# Patient Record
Sex: MALE | Race: WHITE | NOT HISPANIC OR LATINO | Employment: OTHER | URBAN - METROPOLITAN AREA
[De-identification: names, ages, dates, MRNs, and addresses within clinical notes are randomized per-mention and may not be internally consistent; named-entity substitution may affect disease eponyms.]

---

## 2017-03-10 PROBLEM — R31.29 MICROHEMATURIA: Status: ACTIVE | Noted: 2017-03-10

## 2017-03-10 PROBLEM — N20.1 LEFT URETERAL STONE: Status: ACTIVE | Noted: 2017-03-10

## 2017-03-15 PROBLEM — R31.29 MICROHEMATURIA: Status: RESOLVED | Noted: 2017-03-10 | Resolved: 2017-03-15

## 2018-08-20 ENCOUNTER — OFFICE VISIT (OUTPATIENT)
Dept: UROLOGY | Facility: CLINIC | Age: 68
End: 2018-08-20
Payer: MEDICARE

## 2018-08-20 VITALS
SYSTOLIC BLOOD PRESSURE: 136 MMHG | BODY MASS INDEX: 28.4 KG/M2 | HEART RATE: 71 BPM | HEIGHT: 68 IN | DIASTOLIC BLOOD PRESSURE: 90 MMHG | WEIGHT: 187.38 LBS

## 2018-08-20 DIAGNOSIS — N13.8 BPH WITH URINARY OBSTRUCTION: ICD-10-CM

## 2018-08-20 DIAGNOSIS — E29.1 MALE HYPOGONADISM: ICD-10-CM

## 2018-08-20 DIAGNOSIS — N52.01 ERECTILE DYSFUNCTION DUE TO ARTERIAL INSUFFICIENCY: Primary | ICD-10-CM

## 2018-08-20 DIAGNOSIS — I10 ESSENTIAL HYPERTENSION: ICD-10-CM

## 2018-08-20 DIAGNOSIS — N40.1 BPH WITH URINARY OBSTRUCTION: ICD-10-CM

## 2018-08-20 PROBLEM — N20.1 LEFT URETERAL STONE: Status: RESOLVED | Noted: 2017-03-10 | Resolved: 2018-08-20

## 2018-08-20 PROCEDURE — 99213 OFFICE O/P EST LOW 20 MIN: CPT | Mod: PBBFAC | Performed by: UROLOGY

## 2018-08-20 PROCEDURE — 99999 PR PBB SHADOW E&M-EST. PATIENT-LVL III: CPT | Mod: PBBFAC,,, | Performed by: UROLOGY

## 2018-08-20 PROCEDURE — 99204 OFFICE O/P NEW MOD 45 MIN: CPT | Mod: S$PBB,,, | Performed by: UROLOGY

## 2018-08-20 RX ORDER — TADALAFIL 20 MG/1
20 TABLET ORAL DAILY PRN
Qty: 10 TABLET | Refills: 11 | Status: SHIPPED | OUTPATIENT
Start: 2018-08-20 | End: 2019-10-24 | Stop reason: SDUPTHER

## 2018-08-20 RX ORDER — PREGABALIN 75 MG/1
150 CAPSULE ORAL 2 TIMES DAILY
COMMUNITY
End: 2018-11-08 | Stop reason: CLARIF

## 2018-08-20 NOTE — PROGRESS NOTES
CHIEF COMPLAINT:    Mr. Braxton is a 68 y.o. male presenting with ED and LUTS.    PRESENTING ILLNESS:    Naresh Braxton is a 68 y.o. male who's son is friends with Chente Cote who c/o LUTS.  He has nocturia x 4, + decreased FOS, + frequency.  He is on flomax.  He drinks a pot of coffee in the am and 2 glasses of wine at night.  No hematuria.  No dysuria.  He's not pleased with how he voids.    He has ED.  This has been present for > 1 year.  He gets good results with Cialis.    He has a history of hypogonadism.  Managed by his PCP with IM TRT.  He doesn't like the shots.  He last used it 1 month ago.      REVIEW OF SYSTEMS:    Naresh Braxton denies headache, blurred vision, fever, nausea, vomiting, chills, abdominal pain, bleeding per rectum, cough, SOB, recent loss of consciousness, recent mental status changes, seizures, dizziness, or upper or lower extremity weakness.    YIMI  1. 1  2. 2  3. 4  4. 4  5. 4      PATIENT HISTORY:    Past Medical History:   Diagnosis Date    Arthritis     GERD (gastroesophageal reflux disease)     Hernia     Hypertension     Renal stone     Wears glasses        Past Surgical History:   Procedure Laterality Date    APPENDECTOMY      COLONOSCOPY      FRACTURE SURGERY Right     foot    PROSTATE BIOPSY      TONSILLECTOMY         Family History   Problem Relation Age of Onset    Cancer Mother     Heart disease Father     Hypertension Brother        Social History     Socioeconomic History    Marital status:      Spouse name: Not on file    Number of children: Not on file    Years of education: Not on file    Highest education level: Not on file   Social Needs    Financial resource strain: Not on file    Food insecurity - worry: Not on file    Food insecurity - inability: Not on file    Transportation needs - medical: Not on file    Transportation needs - non-medical: Not on file   Occupational History    Not on file   Tobacco Use    Smoking status: Never  Smoker   Substance and Sexual Activity    Alcohol use: Yes    Drug use: Not on file    Sexual activity: Not on file   Other Topics Concern    Not on file   Social History Narrative    Not on file       Allergies:  Patient has no known allergies.    Medications:    Current Outpatient Medications:     aspirin 81 mg Tab, Take by mouth.  , Disp: , Rfl:     ESOMEPRAZOLE MAGNESIUM (NEXIUM ORAL), Take 40 mg by mouth once daily. , Disp: , Rfl:     fluticasone (FLONASE) 50 mcg/actuation nasal spray, 1 spray by Nasal route daily as needed. , Disp: , Rfl:     losartan-hydrochlorothiazide 50-12.5 mg (HYZAAR) 50-12.5 mg per tablet, Take 1 tablet by mouth once daily., Disp: , Rfl:     multivitamin (THERAGRAN) per tablet, Take 1 tablet by mouth once daily., Disp: , Rfl:     pregabalin (LYRICA) 75 MG capsule, Take 150 mg by mouth 2 (two) times daily., Disp: , Rfl:     TAMSULOSIN HCL (FLOMAX ORAL), Take 0.4 mg by mouth once daily., Disp: , Rfl:     testosterone cypionate (DEPOTESTOTERONE CYPIONATE) 100 mg/mL injection, Inject into the muscle every 7 days., Disp: , Rfl:     ZOLPIDEM TARTRATE (AMBIEN ORAL), Take 1 tablet by mouth nightly as needed. , Disp: , Rfl:     tadalafil (CIALIS) 20 MG Tab, Take 1 tablet (20 mg total) by mouth daily as needed. Take 1 hour before intercourse, Disp: 10 tablet, Rfl: 11    PHYSICAL EXAMINATION:    The patient generally appears in good health, is appropriately interactive, and is in no apparent distress.     Eyes: anicteric sclerae, moist conjunctivae; no lid-lag; PERRLA     HENT: Atraumatic; oropharynx clear with moist mucous membranes and no mucosal ulcerations;normal hard and soft palate.  No evidence of lymphadenopathy.    Neck: Trachea midline.  No thyromegaly.    Musculoskeletal: No abnormal gait.    Skin: No lesions.    Mental: Cooperative with normal affect.  Is oriented to time, place, and person.    Neuro: Grossly intact.    Chest: Normal inspiratory effort.   No accessory  muscles.  No audible wheezes.  Respirations symmetric on inspiration and expiration.    Heart: Regular rhythm.      Abdomen:  Soft, non-tender. No masses or organomegaly. Bladder is not palpable. No evidence of flank discomfort. No evidence of inguinal hernia.    Genitourinary: The penis is circumcised with no evidence of plaques or induration. The urethral meatus is normal. The testes, epididymides, and cord structures are normal in size and contour bilaterally. The scrotum is normal in size and contour.    Normal anal sphincter tone. No rectal mass.    The prostate is 40 g. Normal landmarks. Lateral sulci. Median furrow intact.  No nodularity or induration. Seminal vesicles are normal.    Extremities: No clubbing, cyanosis, or edema      LABS:    PVR done immediately after voiding by my nurse is 12 cc.   No results found for: PSA, PSADIAG, PSATOTAL, PSAFREE, PSAFREEPCT    IMPRESSION:    Encounter Diagnoses   Name Primary?    Erectile dysfunction due to arterial insufficiency Yes    BPH with urinary obstruction     Male hypogonadism     Essential hypertension    HTN, controlled      PLAN:    1. Will decrease his caffeine intake and alcohol intake for his LUTS.  2. Will cysto to see if he is a candidate for rezum.  3. Continue Cialis for the ED. Side effects discussed.  A new Rx was given.  Sent to "[facility].  4. Will check a T for the hypogonadism.    Copy to:

## 2018-08-20 NOTE — LETTER
August 20, 2018      Riley Ferrer, DO  [address] [place] [address]  [place] [address]           [facility] - Urology 4th Floor  [address]  [place] [address]  Phone: [phone]          Patient: Naresh Braxton   MR Number: [id number]   YOB: 1950   Date of Visit: 8/20/2018       Dear Dr. Riley Ferrer:    Thank you for referring Naresh Braxton to me for evaluation. Attached you will find relevant portions of my assessment and plan of care.    If you have questions, please do not hesitate to call me. I look forward to following Naresh Braxton along with you.    Sincerely,    Tree Cote MD    Enclosure  CC:  No Recipients    If you would like to receive this communication electronically, please contact [email or web address] or [phone] to request more information on [facility] access.    For providers and/or their staff who would like to refer a patient to [facility], please contact us through our one-stop-shop provider referral line, [facility] , at [phone].    If you feel you have received this communication in error or would no longer like to receive these types of communications, please e-mail [email or web address]

## 2018-08-21 ENCOUNTER — PATIENT MESSAGE (OUTPATIENT)
Dept: UROLOGY | Facility: CLINIC | Age: 68
End: 2018-08-21

## 2018-08-21 ENCOUNTER — TELEPHONE (OUTPATIENT)
Dept: UROLOGY | Facility: CLINIC | Age: 68
End: 2018-08-21

## 2018-08-21 DIAGNOSIS — E29.1 MALE HYPOGONADISM: Primary | ICD-10-CM

## 2018-08-21 NOTE — TELEPHONE ENCOUNTER
Pt notified of results. Pt will call back to schedule lab appt at [facility] facility closer to him. Pt aware and verbalized understanding.

## 2018-08-24 ENCOUNTER — LAB VISIT (OUTPATIENT)
Dept: LAB | Facility: HOSPITAL | Age: 68
End: 2018-08-24
Attending: UROLOGY
Payer: MEDICARE

## 2018-08-24 DIAGNOSIS — E29.1 MALE HYPOGONADISM: ICD-10-CM

## 2018-08-24 LAB
PROLACTIN SERPL IA-MCNC: 7.1 NG/ML
TESTOST SERPL-MCNC: 163 NG/DL

## 2018-08-24 PROCEDURE — 84146 ASSAY OF PROLACTIN: CPT

## 2018-08-24 PROCEDURE — 36415 COLL VENOUS BLD VENIPUNCTURE: CPT | Mod: PO

## 2018-08-24 PROCEDURE — 84403 ASSAY OF TOTAL TESTOSTERONE: CPT

## 2018-08-25 ENCOUNTER — PATIENT MESSAGE (OUTPATIENT)
Dept: UROLOGY | Facility: CLINIC | Age: 68
End: 2018-08-25

## 2018-08-27 ENCOUNTER — TELEPHONE (OUTPATIENT)
Dept: UROLOGY | Facility: CLINIC | Age: 68
End: 2018-08-27

## 2018-09-06 ENCOUNTER — OFFICE VISIT (OUTPATIENT)
Dept: UROLOGY | Facility: CLINIC | Age: 68
End: 2018-09-06
Payer: MEDICARE

## 2018-09-06 VITALS
WEIGHT: 189.63 LBS | BODY MASS INDEX: 28.74 KG/M2 | DIASTOLIC BLOOD PRESSURE: 74 MMHG | HEIGHT: 68 IN | HEART RATE: 59 BPM | SYSTOLIC BLOOD PRESSURE: 158 MMHG

## 2018-09-06 DIAGNOSIS — Z79.899 ENCOUNTER FOR LONG-TERM (CURRENT) USE OF HIGH-RISK MEDICATION: ICD-10-CM

## 2018-09-06 DIAGNOSIS — N13.8 BPH WITH URINARY OBSTRUCTION: ICD-10-CM

## 2018-09-06 DIAGNOSIS — N52.01 ERECTILE DYSFUNCTION DUE TO ARTERIAL INSUFFICIENCY: ICD-10-CM

## 2018-09-06 DIAGNOSIS — E29.1 MALE HYPOGONADISM: Primary | ICD-10-CM

## 2018-09-06 DIAGNOSIS — I10 ESSENTIAL HYPERTENSION: ICD-10-CM

## 2018-09-06 DIAGNOSIS — N40.1 BPH WITH URINARY OBSTRUCTION: ICD-10-CM

## 2018-09-06 PROCEDURE — 99213 OFFICE O/P EST LOW 20 MIN: CPT | Mod: PBBFAC | Performed by: UROLOGY

## 2018-09-06 PROCEDURE — 99214 OFFICE O/P EST MOD 30 MIN: CPT | Mod: S$PBB,,, | Performed by: UROLOGY

## 2018-09-06 PROCEDURE — 99999 PR PBB SHADOW E&M-EST. PATIENT-LVL III: CPT | Mod: PBBFAC,,, | Performed by: UROLOGY

## 2018-09-06 RX ORDER — TESTOSTERONE 20.25 MG/1.25G
GEL TOPICAL
Qty: 1 BOTTLE | Refills: 5 | Status: SHIPPED | OUTPATIENT
Start: 2018-09-06 | End: 2022-04-27 | Stop reason: ALTCHOICE

## 2018-09-06 NOTE — PROGRESS NOTES
CHIEF COMPLAINT:    Mr. Braxton is a 68 y.o. male presenting with ED and LUTS.    PRESENTING ILLNESS:    Naresh Braxton is a 68 y.o. male who's son is friends with Chente Cote who c/o LUTS.  He has nocturia x 4, + decreased FOS, + frequency.  He is on flomax.  He's decreased his caffeine intake and still has the LUTS.  Has 2 glasses of wine at night.  No hematuria.  No dysuria.  He's not pleased with how he voids.  Is scheduled for cysto to see if he is a candidate for Rezum.    He has ED.  This has been present for > 1 year.  He gets good results with Cialis.    He has a history of hypogonadism.  Managed by his PCP with [facility].  He didn't like the shots.  I checked a T on him which was low.  He's here to discuss.    REVIEW OF SYSTEMS:    Naresh Braxton denies headache, blurred vision, fever, nausea, vomiting, chills, abdominal pain, bleeding per rectum, cough, SOB, recent loss of consciousness, recent mental status changes, seizures, dizziness, or upper or lower extremity weakness.    YIMI  1. 2  2. 3  3. 3  4. 4  5. 4     PATIENT HISTORY:    Past Medical History:   Diagnosis Date    Arthritis     GERD (gastroesophageal reflux disease)     Hernia     Hypertension     Renal stone     Wears glasses        Past Surgical History:   Procedure Laterality Date    APPENDECTOMY      COLONOSCOPY      FRACTURE SURGERY Right     foot    PROSTATE BIOPSY      TONSILLECTOMY         Family History   Problem Relation Age of Onset    Cancer Mother     Heart disease Father     Hypertension Brother        Social History     Socioeconomic History    Marital status:      Spouse name: Not on file    Number of children: Not on file    Years of education: Not on file    Highest education level: Not on file   Social Needs    Financial resource strain: Not on file    Food insecurity - worry: Not on file    Food insecurity - inability: Not on file    Transportation needs - medical: Not on file    Transportation  needs - non-medical: Not on file   Occupational History    Not on file   Tobacco Use    Smoking status: Never Smoker   Substance and Sexual Activity    Alcohol use: Yes    Drug use: Not on file    Sexual activity: Not on file   Other Topics Concern    Not on file   Social History Narrative    Not on file       Allergies:  Patient has no known allergies.    Medications:    Current Outpatient Medications:     aspirin 81 mg Tab, Take by mouth.  , Disp: , Rfl:     ESOMEPRAZOLE MAGNESIUM (NEXIUM ORAL), Take 40 mg by mouth once daily. , Disp: , Rfl:     fluticasone (FLONASE) 50 mcg/actuation nasal spray, 1 spray by Nasal route daily as needed. , Disp: , Rfl:     losartan-hydrochlorothiazide 50-12.5 mg (HYZAAR) 50-12.5 mg per tablet, Take 1 tablet by mouth once daily., Disp: , Rfl:     multivitamin (THERAGRAN) per tablet, Take 1 tablet by mouth once daily., Disp: , Rfl:     pregabalin (LYRICA) 75 MG capsule, Take 150 mg by mouth 2 (two) times daily., Disp: , Rfl:     tadalafil (CIALIS) 20 MG Tab, Take 1 tablet (20 mg total) by mouth daily as needed. Take 1 hour before intercourse, Disp: 10 tablet, Rfl: 11    TAMSULOSIN HCL (FLOMAX ORAL), Take 0.4 mg by mouth once daily., Disp: , Rfl:     testosterone cypionate (DEPOTESTOTERONE CYPIONATE) 100 mg/mL injection, Inject into the muscle every 7 days., Disp: , Rfl:     ZOLPIDEM TARTRATE (AMBIEN ORAL), Take 1 tablet by mouth nightly as needed. , Disp: , Rfl:     PHYSICAL EXAMINATION:    The patient generally appears in good health, is appropriately interactive, and is in no apparent distress.     Eyes: anicteric sclerae, moist conjunctivae; no lid-lag; PERRLA     HENT: Atraumatic; oropharynx clear with moist mucous membranes and no mucosal ulcerations;normal hard and soft palate.  No evidence of lymphadenopathy.    Neck: Trachea midline.  No thyromegaly.    Musculoskeletal: No abnormal gait.    Skin: No lesions.    Mental: Cooperative with normal affect.  Is  oriented to time, place, and person.    Neuro: Grossly intact.    Chest: Normal inspiratory effort.   No accessory muscles.  No audible wheezes.  Respirations symmetric on inspiration and expiration.    Heart: Regular rhythm.      Abdomen:  Soft, non-tender. No masses or organomegaly. Bladder is not palpable. No evidence of flank discomfort. No evidence of inguinal hernia.    Genitourinary: The penis is circumcised with no evidence of plaques or induration. The urethral meatus is normal. The testes, epididymides, and cord structures are normal in size and contour bilaterally. The scrotum is normal in size and contour.    Normal anal sphincter tone. No rectal mass.    The prostate is 40 g. Normal landmarks. Lateral sulci. Median furrow intact.  No nodularity or induration. Seminal vesicles are normal.    Extremities: No clubbing, cyanosis, or edema      LABS:    PVR done immediately after voiding by my nurse is 12 cc.   No results found for: PSA, PSADIAG, PSATOTAL, PSAFREE, PSAFREEPCT    IMPRESSION:    Encounter Diagnoses   Name Primary?    Male hypogonadism Yes    BPH with urinary obstruction     Erectile dysfunction due to arterial insufficiency     Essential hypertension    HTN, controlled      PLAN:    1. Will cysto to see if he is a candidate for rezum.  2. Continue Cialis for the ED.   3. Discussed the risks and benefits of testosterone replacement today.  This included possible cardiac risks.  He would like to try this.  Will check a T in 2 weeks and adjust the dose of TRT if necessary.  He can then RTC 3 months with T, PSA, CBC, hepatic panel, lipid panel.  A new Rx for Androgel 1.62% was given today.       Copy to:

## 2018-09-06 NOTE — PATIENT INSTRUCTIONS
What is this medicine?  TESTOSTERONE (marbella TOS ter one) is the main male hormone. It supports normal male traits such as muscle growth, facial hair, and deep voice. This gel is used in males to treat low testosterone levels.  This medicine may be used for other purposes; ask your health care provider or pharmacist if you have questions.  What should I tell my health care provider before I take this medicine?  They need to know if you have any of these conditions:  · breast cancer  · diabetes  · heart disease  · if a female partner is pregnant or trying to get pregnant  · kidney disease  · liver disease  · lung disease  · prostate cancer, enlargement  · an unusual or allergic reaction to testosterone, soy proteins, other medicines, foods, dyes, or preservatives  · pregnant or trying to get pregnant  · breast-feeding  How should I use this medicine?  This medicine is for external use only. This medicine is applied at the same time every day (preferably in the morning) to clean, dry, intact skin. If you take a bath or shower in the morning, apply the gel after the bath or shower. Follow the directions on the prescription label. Make sure that you are using your testosterone gel product correctly and applying it only to the appropriate skin area (see below). Allow the skin to dry a few minutes then cover with clothing to prevent others from coming in contact with the medicine on your skin. The gel is flammable. Avoid fire, flame, or smoking until the gel has dried. Wash your hands with soap and water after use.  For AndroGel Packets: Open the packet(s) needed for your dose. You can put the entire dose into your palm all at once or just a little at a time to apply. If you prefer, you can instead squeeze the gel directly onto the area you are applying it to. Apply on the shoulders as directed. Do not apply to the scrotum or genitals. Be sure you use the correct total dose. It is best to wait 5 to 6 hours after application  of the gel before showering or swimming.  For AndroGel 1%: Pump the dose into the palm of your hand. You can put the entire dose into your palm all at once or just a little at a time to apply. If you prefer, you can instead pump the gel directly onto the area you are applying it to. Apply on the shoulders as directed. Do not apply to the scrotum or genitals. Be sure you use the correct total dose. It is best to wait for 5 to 6 hours after application of the gel before showering or swimming.  For AndroGel 1.62%: Pump the dose into the palm of your hand. Dispense one pump of gel at a time into the palm of your hand before applying it. If you prefer, you can instead pump the gel directly onto the area you are applying it to. Apply on the shoulders and upper arms as directed. Do not apply to other parts of the body including the abdomen or genitals. Be sure you use the correct total dose. It is best to wait 2 hours after application of the gel before washing, showering, or swimming.  For Testim: Open the tube(s) needed for your dose. Squeeze the gel from the tube into the palm of your hand. Apply on the shoulders or upper arms as directed. Do not apply to the scrotum, genitals, or abdomen. Be sure you use the correct total dose. Do not shower or swim for at least 2 hours after application of the gel.  For Fortesta: Use the multi-dose pump to pump the gel directly onto the area you are applying it to. Apply on the thighs as directed. Do not apply to the abdomen, penis, scrotum, shoulders or upper arms. Gently rub the gel onto the skin using your finger. Be sure you use the correct total dose. Do not shower or swim for at least 2 hours after application of the gel.  For Axiron: Use the multi-dose pump to pump the gel into the applicator.  Apply under the arm as directed.  Do not apply to other locations.  Do not shower or swim for at least 2 hours after application of the gel.  A special MedGuide will be given to you by  the pharmacist with each prescription and refill. Be sure to read this information carefully each time.  Talk to your pediatrician regarding the use of this medicine in children. Special care may be needed.  Overdosage: If you think you have taken too much of this medicine contact a poison control center or emergency room at once.  NOTE: This medicine is only for you. Do not share this medicine with others.  What if I miss a dose?  If you miss a dose, use it as soon as you can. If it is almost time for your next dose, use only that dose. Do not use double or extra doses.  What may interact with this medicine?  · medicines for diabetes  · medicines that treat or prevent blood clots like warfarin  · oxyphenbutazone  · propranolol  · steroid medicines like prednisone or cortisone  This list may not describe all possible interactions. Give your health care provider a list of all the medicines, herbs, non-prescription drugs, or dietary supplements you use. Also tell them if you smoke, drink alcohol, or use illegal drugs. Some items may interact with your medicine.  What should I watch for while using this medicine?  Visit your doctor or health care professional for regular checks on your progress. They will need to check the level of testosterone in your blood.  This medicine can transfer from your body to others. If a person or pet comes in contact with the area where this medicine was applied to your skin, they may have a serious risk of side effects. If you cannot avoid skin-to-skin contact with another person, make sure the site where this medicine was applied is covered with clothing. If accidental contact happens, the skin of the person or pet should be washed right away with soap and water. Also, a female partner who is pregnant or trying to get pregnant should avoid contact with the gel or treated skin.  This medicine may affect blood sugar levels. If you have diabetes, check with your doctor or health care  professional before you change your diet or the dose of your diabetic medicine.  This drug is banned from use in athletes by most athletic organizations.  What side effects may I notice from receiving this medicine?  Side effects that you should report to your doctor or health care professional as soon as possible:  · allergic reactions like skin rash, itching or hives, swelling of the face, lips, or tongue  · breast enlargement  · breathing problems  · changes in mood, especially anger, depression, or rage  · dark urine  · general ill feeling or flu-like symptoms  · light-colored stools  · loss of appetite, nausea  · nausea, vomiting  · right upper belly pain  · stomach pain  · swelling of ankles  · too frequent or persistent erections  · trouble passing urine or change in the amount of urine  · unusually weak or tired  · yellowing of the eyes or skin  Side effects that usually do not require medical attention (report to your doctor or health care professional if they continue or are bothersome):  · acne  · change in sex drive or performance  · hair loss  · headache  This list may not describe all possible side effects. Call your doctor for medical advice about side effects. You may report side effects to FDA at 3-019-FDA-9979.  Where should I keep my medicine?  Keep out of the reach of children. This medicine can be abused. Keep your medicine in a safe place to protect it from theft. Do not share this medicine with anyone. Selling or giving away this medicine is dangerous and against the law.  Store at room temperature between 15 to 30 degrees C (59 to 86 degrees F). Keep closed until use. Protect from heat and light. This medicine is flammable. Avoid exposure to heat, fire, flame, and smoking. Throw away any unused medicine after the expiration date.  NOTE:This sheet is a summary. It may not cover all possible information. If you have questions about this medicine, talk to your doctor, pharmacist, or health care  provider. Copyright© 2011 [employer]

## 2018-09-13 ENCOUNTER — PATIENT MESSAGE (OUTPATIENT)
Dept: UROLOGY | Facility: CLINIC | Age: 68
End: 2018-09-13

## 2018-09-20 ENCOUNTER — LAB VISIT (OUTPATIENT)
Dept: LAB | Facility: HOSPITAL | Age: 68
End: 2018-09-20
Attending: UROLOGY
Payer: MEDICARE

## 2018-09-20 ENCOUNTER — PATIENT MESSAGE (OUTPATIENT)
Dept: UROLOGY | Facility: CLINIC | Age: 68
End: 2018-09-20

## 2018-09-20 ENCOUNTER — TELEPHONE (OUTPATIENT)
Dept: UROLOGY | Facility: CLINIC | Age: 68
End: 2018-09-20

## 2018-09-20 DIAGNOSIS — E29.1 MALE HYPOGONADISM: ICD-10-CM

## 2018-09-20 LAB — TESTOST SERPL-MCNC: 310 NG/DL

## 2018-09-20 PROCEDURE — 84403 ASSAY OF TOTAL TESTOSTERONE: CPT

## 2018-09-20 PROCEDURE — 36415 COLL VENOUS BLD VENIPUNCTURE: CPT | Mod: PO

## 2018-10-12 ENCOUNTER — HOSPITAL ENCOUNTER (OUTPATIENT)
Dept: UROLOGY | Facility: HOSPITAL | Age: 68
Discharge: HOME | End: 2018-10-12
Attending: UROLOGY
Payer: MEDICARE

## 2018-10-12 ENCOUNTER — TELEPHONE (OUTPATIENT)
Dept: UROLOGY | Facility: CLINIC | Age: 68
End: 2018-10-12

## 2018-10-12 ENCOUNTER — DOCUMENTATION ONLY (OUTPATIENT)
Dept: UROLOGY | Facility: HOSPITAL | Age: 68
End: 2018-10-12

## 2018-10-12 VITALS
DIASTOLIC BLOOD PRESSURE: 89 MMHG | HEIGHT: 68 IN | WEIGHT: 194.69 LBS | TEMPERATURE: 98 F | HEART RATE: 71 BPM | BODY MASS INDEX: 29.51 KG/M2 | SYSTOLIC BLOOD PRESSURE: 183 MMHG | RESPIRATION RATE: 16 BRPM

## 2018-10-12 DIAGNOSIS — N40.1 BPH WITH URINARY OBSTRUCTION: Primary | ICD-10-CM

## 2018-10-12 DIAGNOSIS — N32.9 LESION OF BLADDER: ICD-10-CM

## 2018-10-12 DIAGNOSIS — N13.8 BPH WITH URINARY OBSTRUCTION: Primary | ICD-10-CM

## 2018-10-12 PROCEDURE — 52000 CYSTOURETHROSCOPY: CPT

## 2018-10-12 PROCEDURE — 52000 CYSTOURETHROSCOPY: CPT | Mod: ,,, | Performed by: UROLOGY

## 2018-10-12 RX ORDER — LIDOCAINE HYDROCHLORIDE 20 MG/ML
JELLY TOPICAL
Status: COMPLETED | OUTPATIENT
Start: 2018-10-12 | End: 2018-10-12

## 2018-10-12 RX ORDER — LIDOCAINE HYDROCHLORIDE 20 MG/ML
JELLY TOPICAL ONCE
Status: ACTIVE | OUTPATIENT
Start: 2018-10-12

## 2018-10-12 RX ADMIN — LIDOCAINE HYDROCHLORIDE: 20 JELLY TOPICAL at 08:10

## 2018-10-12 NOTE — PROGRESS NOTES
Discussed risks/benefits of Rezum.  Discussed bleeding, frequency, failure amongst other risks.  Also discussed very small risk of EjD and ED.  He was given the chance to ask questions.  Alternatives discussed.  Will schedule for Rezum.

## 2018-10-12 NOTE — PROCEDURES
Date: 10/12/2018     Surgeon: Kera    Assistant: None    Procedure performed: cystoscopy    Blood loss: None    Specimen: None    Procedure in detail: Using standard sterile technique, the flexible cystoscope was assembled and passed into the patient's bladder.  Cystoscopic evaluation of the bladder revealed a small papillary bladder lesion just posterior to the R trigone.  The estimated prostatic length was 5 cm.

## 2018-10-12 NOTE — H&P
CHIEF COMPLAINT:     Mr. Braxton is a 68 y.o. male presenting with ED and LUTS.     PRESENTING ILLNESS:     Naresh Braxton is a 68 y.o. male who's son is friends with Chente Cote who c/o LUTS.  He has nocturia x 4, + decreased FOS, + frequency.  He is on flomax.  He's decreased his caffeine intake and still has the LUTS.  Has 2 glasses of wine at night.  No hematuria.  No dysuria.  He's not pleased with how he voids.  Is scheduled for cysto to see if he is a candidate for Rezum.     He has ED.  This has been present for > 1 year.  He gets good results with Cialis.     He has a history of hypogonadism.  Managed by his PCP with [facility] PEREZ.  He didn't like the shots.  I checked a T on him which was low.  He's here to discuss.     REVIEW OF SYSTEMS:     Naresh Braxton denies headache, blurred vision, fever, nausea, vomiting, chills, abdominal pain, bleeding per rectum, cough, SOB, recent loss of consciousness, recent mental status changes, seizures, dizziness, or upper or lower extremity weakness.     YIMI  1. 2  2. 3  3. 3  4. 4  5. 4      PATIENT HISTORY:          Past Medical History:   Diagnosis Date    Arthritis      GERD (gastroesophageal reflux disease)      Hernia      Hypertension      Renal stone      Wears glasses                 Past Surgical History:   Procedure Laterality Date    APPENDECTOMY        COLONOSCOPY        FRACTURE SURGERY Right       foot    PROSTATE BIOPSY        TONSILLECTOMY                   Family History   Problem Relation Age of Onset    Cancer Mother      Heart disease Father      Hypertension Brother           Social History               Socioeconomic History    Marital status:        Spouse name: Not on file    Number of children: Not on file    Years of education: Not on file    Highest education level: Not on file   Social Needs    Financial resource strain: Not on file    Food insecurity - worry: Not on file    Food insecurity - inability: Not on file     Transportation needs - medical: Not on file    Transportation needs - non-medical: Not on file   Occupational History    Not on file   Tobacco Use    Smoking status: Never Smoker   Substance and Sexual Activity    Alcohol use: Yes    Drug use: Not on file    Sexual activity: Not on file   Other Topics Concern    Not on file   Social History Narrative    Not on file            Allergies:  Patient has no known allergies.     Medications:     Current Outpatient Medications:     aspirin 81 mg Tab, Take by mouth.  , Disp: , Rfl:     ESOMEPRAZOLE MAGNESIUM (NEXIUM ORAL), Take 40 mg by mouth once daily. , Disp: , Rfl:     fluticasone (FLONASE) 50 mcg/actuation nasal spray, 1 spray by Nasal route daily as needed. , Disp: , Rfl:     losartan-hydrochlorothiazide 50-12.5 mg (HYZAAR) 50-12.5 mg per tablet, Take 1 tablet by mouth once daily., Disp: , Rfl:     multivitamin (THERAGRAN) per tablet, Take 1 tablet by mouth once daily., Disp: , Rfl:     pregabalin (LYRICA) 75 MG capsule, Take 150 mg by mouth 2 (two) times daily., Disp: , Rfl:     tadalafil (CIALIS) 20 MG Tab, Take 1 tablet (20 mg total) by mouth daily as needed. Take 1 hour before intercourse, Disp: 10 tablet, Rfl: 11    TAMSULOSIN HCL (FLOMAX ORAL), Take 0.4 mg by mouth once daily., Disp: , Rfl:     testosterone cypionate (DEPOTESTOTERONE CYPIONATE) 100 mg/mL injection, Inject into the muscle every 7 days., Disp: , Rfl:     ZOLPIDEM TARTRATE (AMBIEN ORAL), Take 1 tablet by mouth nightly as needed. , Disp: , Rfl:      PHYSICAL EXAMINATION:     The patient generally appears in good health, is appropriately interactive, and is in no apparent distress.      Eyes: anicteric sclerae, moist conjunctivae; no lid-lag; PERRLA      HENT: Atraumatic; oropharynx clear with moist mucous membranes and no mucosal ulcerations;normal hard and soft palate.  No evidence of lymphadenopathy.     Neck: Trachea midline.  No thyromegaly.     Musculoskeletal: No abnormal  gait.     Skin: No lesions.     Mental: Cooperative with normal affect.  Is oriented to time, place, and person.     Neuro: Grossly intact.     Chest: Normal inspiratory effort.   No accessory muscles.  No audible wheezes.  Respirations symmetric on inspiration and expiration.     Heart: Regular rhythm.       Abdomen:  Soft, non-tender. No masses or organomegaly. Bladder is not palpable. No evidence of flank discomfort. No evidence of inguinal hernia.     Genitourinary: The penis is circumcised with no evidence of plaques or induration. The urethral meatus is normal. The testes, epididymides, and cord structures are normal in size and contour bilaterally. The scrotum is normal in size and contour.     Normal anal sphincter tone. No rectal mass.     The prostate is 40 g. Normal landmarks. Lateral sulci. Median furrow intact.  No nodularity or induration. Seminal vesicles are normal.     Extremities: No clubbing, cyanosis, or edema        LABS:     PVR done immediately after voiding by my nurse is 12 cc.   No results found for: PSA, PSADIAG, PSATOTAL, PSAFREE, PSAFREEPCT     IMPRESSION:          Encounter Diagnoses   Name Primary?    Male hypogonadism Yes    BPH with urinary obstruction      Erectile dysfunction due to arterial insufficiency      Essential hypertension     HTN, controlled        PLAN:     1. Cysto

## 2018-10-12 NOTE — PATIENT INSTRUCTIONS
What to Expect After a Cystoscopy  For the next 24-48 hours, you may feel a mild burning when you urinate. This burning is normal and expected. Drink plenty of water to dilute the urine to help relieve the burning sensation. You may also see a small amount of blood in your urine after the procedure.    Unless you are already taking antibiotics, you may be given an antibiotic after the test to prevent infection.    Signs and Symptoms to Report  Call the [facility] at [phone] if you develop any of the following:  · Fever of 101 degrees or higher  · Chills or persistent bleeding  · Inability to urinate

## 2018-11-07 ENCOUNTER — PATIENT MESSAGE (OUTPATIENT)
Dept: UROLOGY | Facility: CLINIC | Age: 68
End: 2018-11-07

## 2018-11-08 ENCOUNTER — ANESTHESIA EVENT (OUTPATIENT)
Dept: SURGERY | Facility: HOSPITAL | Age: 68
End: 2018-11-08
Payer: MEDICARE

## 2018-11-08 DIAGNOSIS — Z01.818 PREOPERATIVE TESTING: Primary | ICD-10-CM

## 2018-11-08 NOTE — PRE-PROCEDURE INSTRUCTIONS
Preop instructions given and reviewed.  Patient's wife verbalized understanding.  Patient's wife instructed to call POC with any questions or changes.

## 2018-11-08 NOTE — PRE ADMISSION SCREENING
Anesthesia Assessment: Preoperative EQUATION    Planned Procedure: Procedure(s) (LRB):  BIOPSY, BLADDER (N/A)  DESTRUCTION, PROSTATE, TRANSURETHRAL (N/A)  Requested Anesthesia Type:Monitor Anesthesia Care  Surgeon: Tree Cote MD  Service: Urology  Known or anticipated Date of Surgery:11/13/2018    Surgeon notes: reviewed    Electronic QUestionnaire Assessment completed via nurse interview with patient.        NO AQ      Triage considerations:     The patient has no apparent active cardiac condition (No unstable coronary Syndrome such as severe unstable angina or recent [<1 month] myocardial infarction, decompensated CHF, severe valvular   disease or significant arrhythmia)    Previous anesthesia records:GETA, MAC and No problems     NOTE: Per Pt's wife:  2/2018 Cervical Fusion; 10/2018 Laminectomy (noted NO difficulty with intubation following Cervical Fusion) No records available, procedures not completed at [facility]    3/16/2017 CYSTOSCOPY WITH RETROGRADE PYELOGRAM (Bilateral Urethra) LITHOTRIPSY-LASER (Left Ureter) MANIPULATION-STONE (Left Ureter) PLACEMENT-STENT URETERAL (Left Ureter) CATHETERIZATION-URETHRAL (N/A Urethra) EXAM UNDER ANESTHESIA-DIGITAL-RECTAL    Airway/Jaw/Neck:  Airway Findings: Mouth Opening: Normal Tongue: Normal  General Airway Assessment: Adult  Mallampati: I  TM Distance: Normal, at least 6 cm  Jaw/Neck Findings:  Neck ROM: Normal ROM        Last PCP note: > 1 year ago , outside [facility]   Subspecialty notes: Cardiology: General, Gastroenterology, Neurology    Other important co-morbidities: HTN, GERD, Arthritis, BPH     Tests already available:  Available tests,  > 1 year ago , within [facility] . 3/2017 CBC, BMP; 8/2016 EKG (media)            Instructions given. (See in Nurse's note)    Optimization:  Anesthesia Preop Clinic Assessment  Indicated: Not required for this procedure      Medical Opinion: request ASA inst (OS Dr. Bethea)  Plan:    Testing:  Hematology Profile and  BMP (AM of surgery)   Patient  has previously scheduled Medical Appointment: Not at this time    Navigation: Tests Scheduled. (AM of surgery, Pt out of town)    Results will be tracked by Preop Clinic.

## 2018-11-08 NOTE — ANESTHESIA PREPROCEDURE EVALUATION
Stacie Choudhary, RN   Registered Nurse      Pre Admission Screening   Addendum                             []Hide copied text    []Regan for details      Anesthesia Assessment: Preoperative EQUATION     Planned Procedure: Procedure(s) (LRB):  BIOPSY, BLADDER (N/A)  DESTRUCTION, PROSTATE, TRANSURETHRAL (N/A)  Requested Anesthesia Type:Monitor Anesthesia Care  Surgeon: Tree Cote MD  Service: Urology  Known or anticipated Date of Surgery:11/13/2018     Surgeon notes: reviewed     Electronic QUestionnaire Assessment completed via nurse interview with patient.         NO AQ        Triage considerations:      The patient has no apparent active cardiac condition (No unstable coronary Syndrome such as severe unstable angina or recent [<1 month] myocardial infarction, decompensated CHF, severe valvular   disease or significant arrhythmia)     Previous anesthesia records:GETA, MAC and No problems      NOTE: Per Pt's wife:  2/2018 Cervical Fusion; 10/2018 Laminectomy (noted NO difficulty with intubation following Cervical Fusion) No records available, procedures not completed at [facility]     3/16/2017 CYSTOSCOPY WITH RETROGRADE PYELOGRAM (Bilateral Urethra) LITHOTRIPSY-LASER (Left Ureter) MANIPULATION-STONE (Left Ureter) PLACEMENT-STENT URETERAL (Left Ureter) CATHETERIZATION-URETHRAL (N/A Urethra) EXAM UNDER ANESTHESIA-DIGITAL-RECTAL     Airway/Jaw/Neck:  Airway Findings: Mouth Opening: Normal Tongue: Normal  General Airway Assessment: Adult  Mallampati: I  TM Distance: Normal, at least 6 cm  Jaw/Neck Findings:  Neck ROM: Normal ROM         Last PCP note: > 1 year ago , outside [facility]   Subspecialty notes: Cardiology: General, Gastroenterology, Neurology     Other important co-morbidities: HTN, GERD, Arthritis, BPH     Tests already available:  Available tests,  > 1 year ago , within [facility] . 3/2017 CBC, BMP; 8/2016 EKG (media)                       Instructions given. (See in Nurse's  note)     Optimization:  Anesthesia Preop Clinic Assessment  Indicated: Not required for this procedure         Medical Opinion: request ASA inst (OS Dr. Bethea)  Plan:           Testing:  Hematology Profile and BMP (AM of surgery)   Patient  has previously scheduled Medical Appointment: Not at this time     Navigation: Tests Scheduled. (AM of surgery, Pt out of town)          Results will be tracked by Preop Clinic.          11/12/2018 Received clearance and ASA instructions from Cardiology, [facility] (scanned to media)                                                                                                                 11/08/2018  Naresh Braxton is a 68 y.o., male.  Pre-operative evaluation for Procedure(s) (LRB):  BIOPSY, BLADDER (N/A)  DESTRUCTION, PROSTATE, TRANSURETHRAL (N/A)    Naresh Braxton is a 68 y.o. male hypertensive patient with having above procedure under MAC anesthesia. Denies smoking or other concerns today.   LDA:     Prev airway:     Drips:     Patient Active Problem List   Diagnosis    Erectile dysfunction due to arterial insufficiency    BPH with urinary obstruction    Male hypogonadism    Essential hypertension    Encounter for long-term (current) use of high-risk medication       Review of patient's allergies indicates:  No Known Allergies     No current facility-administered medications on file prior to encounter.      Current Outpatient Medications on File Prior to Encounter   Medication Sig Dispense Refill    aspirin 81 mg Tab Take by mouth.        ESOMEPRAZOLE MAGNESIUM (NEXIUM ORAL) Take 40 mg by mouth once daily.       fluticasone (FLONASE) 50 mcg/actuation nasal spray 1 spray by Nasal route daily as needed.       losartan-hydrochlorothiazide 50-12.5 mg (HYZAAR) 50-12.5 mg per tablet Take 1 tablet by mouth once daily.      multivitamin (THERAGRAN) per tablet Take 1 tablet by mouth once daily.      tadalafil (CIALIS) 20 MG Tab  Take 1 tablet (20 mg total) by mouth daily as needed. Take 1 hour before intercourse 10 tablet 11    TAMSULOSIN HCL (FLOMAX ORAL) Take 0.4 mg by mouth once daily.      testosterone (ANDROGEL) 20.25 mg/1.25 gram (1.62 %) GlPm Apply 2 pumps to shoulders daily 1 Bottle 5    ZOLPIDEM TARTRATE (AMBIEN ORAL) Take 1 tablet by mouth nightly as needed.          Past Surgical History:   Procedure Laterality Date    APPENDECTOMY      CATHETERIZATION-URETHRAL N/A 3/16/2017    Performed by [facility]casio [facility] at [facility] OR    COLONOSCOPY      CYSTOSCOPY WITH RETROGRADE PYELOGRAM Bilateral 3/16/2017    Performed by [facility] at [facility] OR    EXAM UNDER ANESTHESIA-DIGITAL-RECTAL N/A 3/16/2017    Performed by [facility]io [facility] at [facility] OR    FRACTURE SURGERY Right     foot    LITHOTRIPSY-LASER Left 3/16/2017    Performed by [facility]io [facility] at [facility] OR    MANIPULATION-STONE Left 3/16/2017    Performed by [facility] Ocasio MD at [facility] OR    PLACEMENT-STENT URETERAL Left 3/16/2017    Per[facility] by [facility] at [facility] OR    PROSTATE BIOPSY      TONSILLECTOMY             Anesthesia Evaluation         Review of Systems  Anesthesia Hx:  No problems with previous Anesthesia History of prior surgery of interest to airway management or planning: cervical fusion. Previous anesthesia: General 10/2018 Laminectomy with general anesthesia.  Denies Family Hx of Anesthesia complications.   Denies Personal Hx of Anesthesia complications.   Social:  Non-Smoker, No Alcohol Use    Hematology/Oncology:  Hematology Normal   Oncology Normal     EENT/Dental:EENT/Dental Normal   Cardiovascular:    Denies Angina.  Functional Capacity good / => 4 METS  Hypertension , Well Controlled on Rx , Recent typical home B/P of 120/70-80   Pulmonary:  Pulmonary Normal  Denies Asthma.  Denies Shortness of breath.  Denies Recent URI.    Renal/:   renal calculi BPH    Hepatic/GI:  Esophageal / Stomach Disorders Gerd Controlled by chronic  antireflux medication.    Musculoskeletal:  Joint Disease:  Arthritis  Cervical Spine Disorder, S/P Cervical Fusion  Lumbar Spine Disorders Laminectomy  Neurological:  Neurology Normal    Endocrine:  Endocrine Normal    Dermatological:  Skin Normal    Psych:  Psychiatric Normal           Physical Exam  General:  Obesity, Well nourished    Airway/Jaw/Neck:  Airway Findings: Mouth Opening: Normal Tongue: Normal  General Airway Assessment: Adult  Mallampati: II  Improves to II with phonation.  TM Distance: Normal, at least 6 cm      Dental:  Dental Findings: In tact        Mental Status:  Mental Status Findings:  Cooperative, Alert and Oriented         Anesthesia Plan  Type of Anesthesia, risks & benefits discussed:  Anesthesia Type:  MAC, general  Patient's Preference:   Intra-op Monitoring Plan:   Intra-op Monitoring Plan Comments:   Post Op Pain Control Plan:   Post Op Pain Control Plan Comments:   Induction:   IV  Beta Blocker:         Informed Consent: Patient understands risks and agrees with Anesthesia plan.  Questions answered. Anesthesia consent signed with patient.  ASA Score: 2     Day of Surgery Review of History & Physical:            Ready For Surgery From Anesthesia Perspective.

## 2018-11-12 ENCOUNTER — TELEPHONE (OUTPATIENT)
Dept: UROLOGY | Facility: CLINIC | Age: 68
End: 2018-11-12

## 2018-11-12 NOTE — TELEPHONE ENCOUNTER
Called pt to confirm arrival time 915am for procedure. Gave pt NPO instructions and gave pt opportunity to ask questions. Pt verbalized understanding.

## 2018-11-13 ENCOUNTER — ANESTHESIA (OUTPATIENT)
Dept: SURGERY | Facility: HOSPITAL | Age: 68
End: 2018-11-13
Payer: MEDICARE

## 2018-11-13 ENCOUNTER — HOSPITAL ENCOUNTER (OUTPATIENT)
Facility: HOSPITAL | Age: 68
Discharge: HOME | End: 2018-11-13
Attending: UROLOGY | Admitting: UROLOGY
Payer: MEDICARE

## 2018-11-13 DIAGNOSIS — N40.1 BPH WITH URINARY OBSTRUCTION: Primary | ICD-10-CM

## 2018-11-13 DIAGNOSIS — N32.9 LESION OF BLADDER: ICD-10-CM

## 2018-11-13 DIAGNOSIS — N13.8 BPH WITH URINARY OBSTRUCTION: Primary | ICD-10-CM

## 2018-11-13 DIAGNOSIS — Z01.818 PREOPERATIVE TESTING: ICD-10-CM

## 2018-11-13 LAB
ANION GAP SERPL CALC-SCNC: 8 MMOL/L
BUN SERPL-MCNC: 18 MG/DL
CALCIUM SERPL-MCNC: 10 MG/DL
CHLORIDE SERPL-SCNC: 106 MMOL/L
CO2 SERPL-SCNC: 28 MMOL/L
CREAT SERPL-MCNC: 0.8 MG/DL
ERYTHROCYTE [DISTWIDTH] IN BLOOD BY AUTOMATED COUNT: 13.6 %
EST. GFR  (AFRICAN AMERICAN): >60 ML/MIN/1.73 M^2
EST. GFR  (NON AFRICAN AMERICAN): >60 ML/MIN/1.73 M^2
GLUCOSE SERPL-MCNC: 95 MG/DL
HCT VFR BLD AUTO: 44.5 %
HGB BLD-MCNC: 15.2 G/DL
MCH RBC QN AUTO: 31.3 PG
MCHC RBC AUTO-ENTMCNC: 34.2 G/DL
MCV RBC AUTO: 92 FL
PLATELET # BLD AUTO: 191 K/UL
PMV BLD AUTO: 10.2 FL
POTASSIUM SERPL-SCNC: 3.8 MMOL/L
RBC # BLD AUTO: 4.86 M/UL
SODIUM SERPL-SCNC: 142 MMOL/L
WBC # BLD AUTO: 4.86 K/UL

## 2018-11-13 PROCEDURE — 88305 TISSUE EXAM BY PATHOLOGIST: CPT | Performed by: PATHOLOGY

## 2018-11-13 PROCEDURE — 80048 BASIC METABOLIC PNL TOTAL CA: CPT

## 2018-11-13 PROCEDURE — 25000003 PHARM REV CODE 250: Performed by: STUDENT IN AN ORGANIZED HEALTH CARE EDUCATION/TRAINING PROGRAM

## 2018-11-13 PROCEDURE — D9220A PRA ANESTHESIA: Mod: ANES,,, | Performed by: ANESTHESIOLOGY

## 2018-11-13 PROCEDURE — 37000008 HC ANESTHESIA 1ST 15 MINUTES: Performed by: UROLOGY

## 2018-11-13 PROCEDURE — 25000003 PHARM REV CODE 250: Performed by: UROLOGY

## 2018-11-13 PROCEDURE — 52204 CYSTOSCOPY W/BIOPSY(S): CPT | Mod: 51,GC,, | Performed by: UROLOGY

## 2018-11-13 PROCEDURE — 25000003 PHARM REV CODE 250: Performed by: NURSE ANESTHETIST, CERTIFIED REGISTERED

## 2018-11-13 PROCEDURE — 63600175 PHARM REV CODE 636 W HCPCS: Performed by: STUDENT IN AN ORGANIZED HEALTH CARE EDUCATION/TRAINING PROGRAM

## 2018-11-13 PROCEDURE — 53852 PROSTATIC RF THERMOTX: CPT | Mod: GC,,, | Performed by: UROLOGY

## 2018-11-13 PROCEDURE — 85027 COMPLETE CBC AUTOMATED: CPT

## 2018-11-13 PROCEDURE — 37000009 HC ANESTHESIA EA ADD 15 MINS: Performed by: UROLOGY

## 2018-11-13 PROCEDURE — 36000707: Performed by: UROLOGY

## 2018-11-13 PROCEDURE — 88305 TISSUE EXAM BY PATHOLOGIST: CPT | Mod: 26,,, | Performed by: PATHOLOGY

## 2018-11-13 PROCEDURE — 36000706: Performed by: UROLOGY

## 2018-11-13 PROCEDURE — 63600175 PHARM REV CODE 636 W HCPCS: Performed by: NURSE ANESTHETIST, CERTIFIED REGISTERED

## 2018-11-13 PROCEDURE — D9220A PRA ANESTHESIA: Mod: CRNA,,, | Performed by: NURSE ANESTHETIST, CERTIFIED REGISTERED

## 2018-11-13 PROCEDURE — 71000015 HC POSTOP RECOV 1ST HR: Performed by: UROLOGY

## 2018-11-13 RX ORDER — CEFAZOLIN SODIUM 1 G/3ML
2 INJECTION, POWDER, FOR SOLUTION INTRAMUSCULAR; INTRAVENOUS
Status: COMPLETED | OUTPATIENT
Start: 2018-11-13 | End: 2018-11-13

## 2018-11-13 RX ORDER — ONDANSETRON 2 MG/ML
INJECTION INTRAMUSCULAR; INTRAVENOUS
Status: DISCONTINUED | OUTPATIENT
Start: 2018-11-13 | End: 2018-11-13

## 2018-11-13 RX ORDER — FENTANYL CITRATE 50 UG/ML
INJECTION, SOLUTION INTRAMUSCULAR; INTRAVENOUS
Status: DISCONTINUED | OUTPATIENT
Start: 2018-11-13 | End: 2018-11-13

## 2018-11-13 RX ORDER — SODIUM CHLORIDE 9 MG/ML
INJECTION, SOLUTION INTRAVENOUS CONTINUOUS
Status: DISCONTINUED | OUTPATIENT
Start: 2018-11-13 | End: 2018-11-13 | Stop reason: HOSPADM

## 2018-11-13 RX ORDER — SODIUM CHLORIDE, SODIUM LACTATE, POTASSIUM CHLORIDE, CALCIUM CHLORIDE 600; 310; 30; 20 MG/100ML; MG/100ML; MG/100ML; MG/100ML
INJECTION, SOLUTION INTRAVENOUS CONTINUOUS PRN
Status: DISCONTINUED | OUTPATIENT
Start: 2018-11-13 | End: 2018-11-13

## 2018-11-13 RX ORDER — LIDOCAINE HCL/PF 100 MG/5ML
SYRINGE (ML) INTRAVENOUS
Status: DISCONTINUED | OUTPATIENT
Start: 2018-11-13 | End: 2018-11-13

## 2018-11-13 RX ORDER — NITROFURANTOIN MACROCRYSTALS 50 MG/1
50 CAPSULE ORAL NIGHTLY
Qty: 7 CAPSULE | Refills: 0 | Status: SHIPPED | OUTPATIENT
Start: 2018-11-13 | End: 2018-11-20

## 2018-11-13 RX ORDER — ATROPA BELLADONNA AND OPIUM 16.2; 6 MG/1; MG/1
60 SUPPOSITORY RECTAL ONCE
Status: COMPLETED | OUTPATIENT
Start: 2018-11-13 | End: 2018-11-13

## 2018-11-13 RX ORDER — OXYBUTYNIN CHLORIDE 5 MG/1
5 TABLET ORAL 3 TIMES DAILY
Status: DISCONTINUED | OUTPATIENT
Start: 2018-11-13 | End: 2018-11-13 | Stop reason: HOSPADM

## 2018-11-13 RX ORDER — ATROPA BELLADONNA AND OPIUM 16.2; 6 MG/1; MG/1
SUPPOSITORY RECTAL
Status: DISCONTINUED
Start: 2018-11-13 | End: 2018-11-13 | Stop reason: HOSPADM

## 2018-11-13 RX ORDER — PROPOFOL 10 MG/ML
INJECTION, EMULSION INTRAVENOUS CONTINUOUS PRN
Status: DISCONTINUED | OUTPATIENT
Start: 2018-11-13 | End: 2018-11-13

## 2018-11-13 RX ORDER — TRAMADOL HYDROCHLORIDE 50 MG/1
50 TABLET ORAL EVERY 4 HOURS PRN
Qty: 8 TABLET | Refills: 0 | Status: SHIPPED | OUTPATIENT
Start: 2018-11-13 | End: 2019-01-16

## 2018-11-13 RX ORDER — PROPOFOL 10 MG/ML
INJECTION, EMULSION INTRAVENOUS
Status: DISCONTINUED | OUTPATIENT
Start: 2018-11-13 | End: 2018-11-13

## 2018-11-13 RX ORDER — OXYBUTYNIN CHLORIDE 5 MG/1
5 TABLET ORAL 3 TIMES DAILY PRN
Qty: 21 TABLET | Refills: 0 | Status: SHIPPED | OUTPATIENT
Start: 2018-11-13 | End: 2019-01-16

## 2018-11-13 RX ORDER — IBUPROFEN 400 MG/1
400 TABLET ORAL 3 TIMES DAILY
Qty: 42 TABLET | Refills: 0 | Status: SHIPPED | OUTPATIENT
Start: 2018-11-13 | End: 2018-11-27

## 2018-11-13 RX ORDER — LIDOCAINE HYDROCHLORIDE 20 MG/ML
JELLY TOPICAL
Status: DISCONTINUED | OUTPATIENT
Start: 2018-11-13 | End: 2018-11-13 | Stop reason: HOSPADM

## 2018-11-13 RX ADMIN — LIDOCAINE HYDROCHLORIDE 40 MG: 20 INJECTION, SOLUTION INTRAVENOUS at 10:11

## 2018-11-13 RX ADMIN — ONDANSETRON 4 MG: 2 INJECTION INTRAMUSCULAR; INTRAVENOUS at 10:11

## 2018-11-13 RX ADMIN — PROPOFOL 85 MCG/KG/MIN: 10 INJECTION, EMULSION INTRAVENOUS at 10:11

## 2018-11-13 RX ADMIN — FENTANYL CITRATE 25 MCG: 50 INJECTION, SOLUTION INTRAMUSCULAR; INTRAVENOUS at 10:11

## 2018-11-13 RX ADMIN — CEFAZOLIN 2 G: 330 INJECTION, POWDER, FOR SOLUTION INTRAMUSCULAR; INTRAVENOUS at 10:11

## 2018-11-13 RX ADMIN — SODIUM CHLORIDE, SODIUM LACTATE, POTASSIUM CHLORIDE, AND CALCIUM CHLORIDE: 600; 310; 30; 20 INJECTION, SOLUTION INTRAVENOUS at 10:11

## 2018-11-13 RX ADMIN — SODIUM CHLORIDE: 0.9 INJECTION, SOLUTION INTRAVENOUS at 10:11

## 2018-11-13 RX ADMIN — PROPOFOL 60 MG: 10 INJECTION, EMULSION INTRAVENOUS at 10:11

## 2018-11-13 RX ADMIN — ATROPA BELLADONNA AND OPIUM 60 MG: 16.2; 6 SUPPOSITORY RECTAL at 11:11

## 2018-11-13 RX ADMIN — OXYBUTYNIN CHLORIDE 5 MG: 5 TABLET ORAL at 11:11

## 2018-11-13 NOTE — TRANSFER OF CARE
"Anesthesia Transfer of Care Note    Patient: Naersh Braxton    Procedure(s) Performed: Procedure(s) (LRB):  CYSTOSCOPY, WITH BLADDER BIOPSY (N/A)  DESTRUCTION, PROSTATE, TRANSURETHRAL (N/A)    Patient location: PACU    Anesthesia Type: MAC    Transport from OR: Transported from OR on room air with adequate spontaneous ventilation    Post pain: adequate analgesia    Post assessment: no apparent anesthetic complications and tolerated procedure well    Post vital signs: stable    Level of consciousness: awake    Nausea/Vomiting: no nausea/vomiting    Complications: none    Transfer of care protocol was followed      Last vitals:   Visit Vitals  BP (!) 176/85 (BP Location: Left arm, Patient Position: Lying)   Pulse (!) 55   Temp 37 °C (98.6 °F) (Oral)   Resp 16   Ht 5' 8" (1.727 m)   Wt 88.9 kg (196 lb)   SpO2 100%   BMI 29.80 kg/m²     "

## 2018-11-13 NOTE — OP NOTE
[facility]  Operative Note     Date: 11/13/2018     Pre-Op Diagnosis:   - BPH with obstructive urinary symptoms  - Bladder lesion    Patient Active Problem List   Diagnosis    Erectile dysfunction due to arterial insufficiency    BPH with urinary obstruction    Male hypogonadism    Essential hypertension    Encounter for long-term (current) use of high-risk medication    Lesion of bladder     Post-Op Diagnosis: same     Procedure(s) Performed:   1. Transurethral destruction of prostate; radiofrequency thermotherapy  2.. Cystoscopy with bladder biopsy and fulguration      Specimen(s): none     Staff Surgeon: Tree Cote MD     Assistant Surgeon: Tree Murphy MD, Mely Adler MD     Anesthesia:  Monitored Local Anesthesia with Sedation     Indications: Naresh Braxton is a 68 y.o. male with BPH presenting for surgical intervention.         Findings:    1. Small papillary bladder tumor < 2 cm posterolateral to the right UO, biopsied and fulgurated  2. Trilobar hypertrophy     Estimated Blood Loss: minimal     Drains:   1.  18 Fr masterson catheter     Procedure in detail: After informed consent was obtained and all questions were answered, the patient was brought to the operating suite and placed in the lithotomy position. General anesthesia was administered. SCDs were applied and working prior to induction of anesthesia. That patient was then prepped and draped in the usual sterile fashion. Time out was performed and preoperative antibiotics were confirmed.      A rigid cystoscope in a 22 Fr sheath was introduced into the bladder per urethra. This passed easily.  The entire urethra was visualized which showed no masses or strictures.  The right and left ureteral orifices were identified in the normal anatomic position and were seen effluxing clear urine.  Formal cystoscopy was performed which revealed a small papillary bladder tumor < 2 cm. There were no trabeculations, no bladder stones and no  bladder diverticuli. The bladder tumor was biopsied and fulgurated with bugbee electrocautery.    Next we inserted the Rezum scope into the bladder. No urethral strictures were seen and scope entered easily.     Next, we examined the prostate and found trilobar hypertrophy. RF was then used to create thermal energy to selectively ablate prostate tissue 1 cm from the bladder neck to the proximal end of the veru spaced 1 cm apart.      6 total treatments were given. Specifically 2 treatments were given in each the 3 and 9 o'clock positions, and 1 each in the 5 and 7 o'clock positions.     At the completion of the procedure the device was removed. There was a careful check that there were no clots in the bladder or injury to the bladder, prostate or urethra.      18 fr masterson catheter was placed with 10 mL of sterile water in the balloon     The patient tolerated the procedure well and was transferred to the PACU in stable condition.       Disposition:  The patient will be discharged home with 7 days of antibiotic therapy, 1 week of pyridium, 1 week of ditropan, 2 weeks of NSAIDS, and pain medications. He will follow up with an GERSON in clinic in 7 days to have his masterson catheter removed.

## 2018-11-13 NOTE — DISCHARGE INSTRUCTIONS
Cystoscopy    Cystoscopy is a procedure that lets your doctor look directly inside your urethra and bladder. It can be used to:  · Help diagnose a problem with your urethra, bladder, or kidneys.  · Take a sample (biopsy) of bladder or urethral tissue.  · Treat certain problems (such as removing kidney stones).  · Place a stent to bypass an obstruction.  · Take special X-rays of the kidneys.  Based on the findings, your doctor may recommend other tests or treatments.  What is a cystoscope?  A cystoscope is a telescope-like instrument that contains lenses and fiberoptics (small glass wires that make bright light). The cystoscope may be straight and rigid, or flexible to bend around curves in the urethra. The doctor may look directly into the cystoscope, or project the image onto a monitor.  Getting ready  · Ask your doctor if you should stop taking any medicines before the procedure.  · Ask whether you should avoid eating or drinking anything after midnight before the procedure.  · Follow any other instructions your doctor gives you.  Tell your doctor before the exam if you:  · Take any medicines, such as aspirin or blood thinners  · Have allergies to any medicines  · Are pregnant   The procedure  Cystoscopy is done in the doctors office, surgery center, or hospital. The doctor and a nurse are present during the procedure. It takes only a few minutes, longer if a biopsy, X-ray, or treatment needs to be done.  During the procedure:  · You lie on an exam table on your back, knees bent and legs apart. You are covered with a drape.  · Your urethra and the area around it are washed. Anesthetic jelly may be applied to numb the urethra. Other pain medicine is usually not needed. In some cases, you may be offered a mild sedative to help you relax. If a more extensive procedure is to be done, such as a biopsy or kidney stone removal, general anesthesia may be needed.  · The cystoscope is inserted. A sterile fluid is put  into the bladder to expand it. You may feel pressure from this fluid.  · When the procedure is done, the cystoscope is removed.  After the procedure  If you had a sedative, general anesthesia, or spinal anesthesia, you must have someone drive you home. Once youre home:  · Drink plenty of fluids.  · You may have burning or light bleeding when you urinate--this is normal.  · Medicines may be prescribed to ease any discomfort or prevent infection. Take these as directed.  · Call your doctor if you have heavy bleeding or blood clots, burning that lasts more than a day, a fever over 100°F  (38° C), or trouble urinating.  Date Last Reviewed: 1/1/2017 © 2000-2017 [facility] [employer]. [address] [place] [address], [place] [address]. All rights reserved. This information is not intended as a substitute for professional medical care. Always follow your healthcare professional's instructions.      After  Prostatectomy  You may go home the same day after your laser prostatectomy. Or you may stay up to 2 nights in the hospital. An adult friend or family member should drive you home. To get the best results from your  prostatectomy, follow your doctors instructions and keep your follow-up appointments.  After your procedure  Your prostate will likely be sore at first. This will improve as you heal. Here are some things you can expect:  · You may be sent home with a catheter to drain urine from your bladder. If so, you may wear a leg bag for a week or so. The catheter will allow the surgery area to heal and help you avoid painful urination.  · Your doctor may also prescribe antibiotics to prevent infection and pain medication to ease any discomfort.  · In about a week, youll visit the doctor to have your catheter removed. If swelling still makes urination difficult, the catheter may be left in for another week. After the catheter is removed, you may need to urinate more often. This is normal and should get better with  time.  Healing  For the first few weeks after your surgery, you may notice that your urine is cloudy or that you have blood or blood clots in your urine. This is normal while your body rids itself of the treated tissue. These symptoms may begin to improve during the first few weeks, but it may take up to 3 months before they go away. Your doctor can tell you when you can resume sexual activity and how soon you can return to work.  Special instructions  You may be told to:  · Avoid certain activities, such as sex, driving, and strenuous exercise. Talk to your doctor about when you can resume these activities.  · Avoid lifting anything over 10 pounds and avoid bending over to lift things from the ground.  · Drink plenty of fluids to flush out your bladder.  Getting back to sex  You may be glad to know that BPH and its treatments rarely cause problems with sex. Even if you have retrograde ejaculation, orgasm shouldnt feel any different than it did before the procedure. Retrograde ejaculation happens when semen goes into the bladder instead of the urethra during ejaculation. This is common after surgery for BPH. This should not cause any health problems or affect your sexual function. If you notice any problems with sex, talk to your doctor. Help may be available.      When to call your healthcare provider  Contact your healthcare provider right away if:  · You have a fever of 100.4°F (38°C) or higher, or as directed by your healthcare provider  · You have excessive bleeding  · You have pain not relieved by medicine  · You notice that no urine is draining from the catheter or if the catheter falls out  · You have frequent or excessive urge to urinate  · Youre not able to urinate, or notice a decrease in urine flow

## 2018-11-13 NOTE — H&P
Urology ([facility]) H&P  Staff: Tree Cote MD    CC: BPH with obstruction, bladder lesion    HPI:  Naresh Braxton is a 68 y.o. male with LUTS. He has nocturia x 4, + decreased FOS, + frequency. These symptoms persist despite flomax. No hematuria. No dysuria. He's not pleased with how he voids. He is interested in Rezum.     He underwent cystoscopy to evaluate for Rezum and was found to have ~5 cm prostate along with a papillary bladder lesion just posterior to the right trigone.    He has no new complaints today.    ROS:  Neg except per HPI    Past Medical History:   Diagnosis Date    Arthritis     GERD (gastroesophageal reflux disease)     Hernia     Hypertension     Renal stone     Wears glasses        Past Surgical History:   Procedure Laterality Date    APPENDECTOMY      CATHETERIZATION-URETHRAL N/A 3/16/2017    [facility]    COLONOSCOPY      CYSTOSCOPY WITH RETROGRADE PYELOGRAM Bilateral 3/16/2017    [facility]    EXAM UNDER ANESTHESIA-DIGITAL-RECTAL N/A 3/16/2017    [facility] OR    FRACTURE SURGERY Right     foot    LITHOTRIPSY-LASER Left 3/16/2017    [facility]    MANIPULATION-STONE Left 3/16/2017    [facility]    PLACEMENT-STENT URETERAL Left 3/16/2017    [facility]    PROSTATE BIOPSY      TONSILLECTOMY         Social History     Socioeconomic History    Marital status:      Spouse name: None    Number of children: None    Years of education: None    Highest education level: None   Social Needs    Financial resource strain: None    Food insecurity - worry: None    Food insecurity - inability: None    Transportation needs - medical: None    Transportation needs - non-medical: None   Occupational History    None   Tobacco Use    Smoking status: Never Smoker    Smokeless tobacco:  "Never Used   Substance and Sexual Activity    Alcohol use: Yes    Drug use: None    Sexual activity: None   Other Topics Concern    None   Social History Narrative    None       Family History   Problem Relation Age of Onset    Cancer Mother     Heart disease Father     Hypertension Brother        Review of patient's allergies indicates:  No Known Allergies    No current facility-administered medications on file prior to encounter.      Current Outpatient Medications on File Prior to Encounter   Medication Sig Dispense Refill    aspirin 81 mg Tab Take by mouth.        ESOMEPRAZOLE MAGNESIUM (NEXIUM ORAL) Take 40 mg by mouth once daily.       fluticasone (FLONASE) 50 mcg/actuation nasal spray 1 spray by Nasal route daily as needed.       losartan-hydrochlorothiazide 50-12.5 mg (HYZAAR) 50-12.5 mg per tablet Take 1 tablet by mouth once daily.      multivitamin (THERAGRAN) per tablet Take 1 tablet by mouth once daily.      tadalafil (CIALIS) 20 MG Tab Take 1 tablet (20 mg total) by mouth daily as needed. Take 1 hour before intercourse 10 tablet 11    TAMSULOSIN HCL (FLOMAX ORAL) Take 0.4 mg by mouth once daily.      testosterone (ANDROGEL) 20.25 mg/1.25 gram (1.62 %) GlPm Apply 2 pumps to shoulders daily 1 Bottle 5    ZOLPIDEM TARTRATE (AMBIEN ORAL) Take 1 tablet by mouth nightly as needed.          Anticoagulation:  Yes aspirin 81 mg held for 7 days    Physical Exam:  Estimated body mass index is 29.8 kg/m² as calculated from the following:    Height as of this encounter: 5' 8" (1.727 m).    Weight as of this encounter: 88.9 kg (196 lb).    General: No acute distress, well developed. AAOx3  Head: Normocephalic, Atraumatic  Eyes: Extra-occular movements intact, No discharge  Neck: supple, symmetrical, trachea midline  Lungs: normal respiratory effort, no respiratory distress, no wheezes  CV: regular rate, 2+ pulses  Abdomen: soft, non-tender, non-distended, no organomegaly  MSK: no edema, no " deformities, normal ROM  Skin: skin color, texture, turgor normal.  Neurologic: no focal deficits, sensation intact    Labs:    Urine dipstick today - Urine negative for blood, nitrites, and leukocytes     Lab Results   Component Value Date    WBC 5.70 03/15/2017    HGB 16.3 03/15/2017    HCT 47.8 03/15/2017    MCV 93 03/15/2017     03/15/2017       BMP  Lab Results   Component Value Date     03/15/2017    K 4.5 03/15/2017     03/15/2017    CO2 33 (H) 03/15/2017    BUN 23 (H) 03/15/2017    CREATININE 1.10 03/15/2017    CALCIUM 9.7 03/15/2017    ESTGFRAFRICA >60 03/15/2017    EGFRNONAA >60 03/15/2017     Assessment: Naresh Braxton is a 68 y.o. male with BPH and a bladder lesion    Plan:     1. To OR today for Rezum with bladder biopsy and fulguration  2. I have explained the risks/benefits of Rezum. Discussed bleeding, irritative voiding symptoms, frequency, failure amongst other risks. He will require a catheter for 3-7 days based on the number of treatments. Also discussed very small risk ED or ejaculatory dysfunction. He was given the chance to ask questions. Alternatives discussed. He has agreed to proceed with Rezum.  3. Discussed risks and benefits of bladder biopsy and fulguration.  4. Consent obtained and signed    Tree Murphy MD

## 2018-11-13 NOTE — DISCHARGE SUMMARY
[facility]  Discharge Note  Short Stay    Admit Date: 11/13/2018    Discharge Date and Time: 11/13/2018 11:00 AM      Attending Physician: Tree Cote MD     Discharge Provider: Tree Murphy MD    Diagnoses:  Active Hospital Problems    Diagnosis  POA    *BPH with urinary obstruction [N40.1, N13.8]  Yes    Lesion of bladder [N32.9]  Yes    Encounter for long-term (current) use of high-risk medication [Z79.899]  Not Applicable    Essential hypertension [I10]  Yes    Erectile dysfunction due to arterial insufficiency [N52.01]  Yes    Male hypogonadism [E29.1]  Yes      Resolved Hospital Problems   No resolved problems to display.       Discharged Condition: stable    Hospital Course: Patient was admitted for bladder biopsy and fulguration and Rezum and tolerated the procedure well with no complications. The patient was discharged home in good condition on the same day.       Final Diagnoses: Same as principal problem.    Disposition: Home or Self Care    Follow up/Patient Instructions:    Medications:  Reconciled Home Medications:   Current Discharge Medication List      START taking these medications    Details   ibuprofen (ADVIL,MOTRIN) 400 MG tablet Take 1 tablet (400 mg total) by mouth 3 (three) times daily. for 14 days  Qty: 42 tablet, Refills: 0      nitrofurantoin (MACRODANTIN) 50 MG capsule Take 1 capsule (50 mg total) by mouth every evening. for 7 days  Qty: 7 capsule, Refills: 0      oxybutynin (DITROPAN) 5 MG Tab Take 1 tablet (5 mg total) by mouth 3 (three) times daily as needed (bladder spasms).  Qty: 21 tablet, Refills: 0      phenazopyridine (AZO STANDARD MAXIMUM STRENGTH) 97.5 mg Tab Take 2 tablets by mouth 3 (three) times daily as needed (painful urination).  Qty: 21 each, Refills: 0      traMADol (ULTRAM) 50 mg tablet Take 1 tablet (50 mg total) by mouth every 4 (four) hours as needed for Pain.  Qty: 8 tablet, Refills: 0         CONTINUE these medications which have NOT  CHANGED    Details   aspirin 81 mg Tab Take by mouth.        ESOMEPRAZOLE MAGNESIUM (NEXIUM ORAL) Take 40 mg by mouth once daily.       fluticasone (FLONASE) 50 mcg/actuation nasal spray 1 spray by Nasal route daily as needed.       losartan-hydrochlorothiazide 50-12.5 mg (HYZAAR) 50-12.5 mg per tablet Take 1 tablet by mouth once daily.      multivitamin (THERAGRAN) per tablet Take 1 tablet by mouth once daily.      tadalafil (CIALIS) 20 MG Tab Take 1 tablet (20 mg total) by mouth daily as needed. Take 1 hour before intercourse  Qty: 10 tablet, Refills: 11      TAMSULOSIN HCL (FLOMAX ORAL) Take 0.4 mg by mouth once daily.      testosterone (ANDROGEL) 20.25 mg/1.25 gram (1.62 %) GlPm Apply 2 pumps to shoulders daily  Qty: 1 Bottle, Refills: 5      ZOLPIDEM TARTRATE (AMBIEN ORAL) Take 1 tablet by mouth nightly as needed.            Discharge Procedure Orders   Call MD for:  temperature >100.4     Call MD for:  persistent nausea and vomiting or diarrhea     Call MD for:  severe uncontrolled pain     Call MD for:  difficulty breathing or increased cough     Call MD for:  severe persistent headache     Call MD for:  persistent dizziness, light-headedness, or visual disturbances     Call MD for:  increased confusion or weakness     No dressing needed     Activity as tolerated     Follow-up Information     [facility] 4th Floor In 1 week.    Specialty:  Urology  Why:  s/p Rezum, voiding trial  Contact information:  [facility]  [facility] [address] [phone]  Additional information:  [facility]                 Discharge Procedure Orders (must include Diet, Follow-up, Activity):   Discharge Procedure Orders (must include Diet, Follow-up, Activity)   Call MD for:  temperature >100.4     Call MD for:  persistent nausea and vomiting or diarrhea     Call MD for:  severe uncontrolled pain     Call MD for:  difficulty breathing or increased cough     Call MD for:  severe persistent headache      Call MD for:  persistent dizziness, light-headedness, or visual disturbances     Call MD for:  increased confusion or weakness     No dressing needed     Activity as tolerated

## 2018-11-13 NOTE — ANESTHESIA POSTPROCEDURE EVALUATION
"Anesthesia Post Evaluation    Patient: Naresh Braxton    Procedure(s) Performed: Procedure(s) (LRB):  CYSTOSCOPY, WITH BLADDER BIOPSY (N/A)  DESTRUCTION, PROSTATE, TRANSURETHRAL (N/A)    Final Anesthesia Type: general  Patient location during evaluation: PACU  Patient participation: Yes- Able to Participate  Level of consciousness: awake and alert  Post-procedure vital signs: reviewed and stable  Pain management: adequate  Airway patency: patent  PONV status at discharge: No PONV  Anesthetic complications: no      Cardiovascular status: blood pressure returned to baseline  Respiratory status: unassisted  Hydration status: euvolemic  Follow-up not needed.        Visit Vitals  /69 (BP Location: Left arm, Patient Position: Lying)   Pulse (!) 55   Temp 36.8 °C (98.2 °F) (Temporal)   Resp 12   Ht 5' 8" (1.727 m)   Wt 88.9 kg (196 lb)   SpO2 100%   BMI 29.80 kg/m²       Pain/Alison Score: Pain Assessment Performed: Yes (11/13/2018 11:09 AM)  Presence of Pain: denies (11/13/2018 11:09 AM)  Pain Rating Prior to Med Admin: 6 (11/13/2018 11:45 AM)        "

## 2018-11-14 ENCOUNTER — TELEPHONE (OUTPATIENT)
Dept: UROLOGY | Facility: CLINIC | Age: 68
End: 2018-11-14

## 2018-11-14 VITALS
RESPIRATION RATE: 18 BRPM | TEMPERATURE: 98 F | BODY MASS INDEX: 29.7 KG/M2 | SYSTOLIC BLOOD PRESSURE: 145 MMHG | OXYGEN SATURATION: 100 % | DIASTOLIC BLOOD PRESSURE: 66 MMHG | HEART RATE: 55 BPM | HEIGHT: 68 IN | WEIGHT: 196 LBS

## 2018-11-14 NOTE — TELEPHONE ENCOUNTER
----- Message from Sujey Vo sent at 11/14/2018 11:51 AM CST -----  Contact: Linda Braxton (wife): [phone]  Needs Advice    Reason for call: pt's wife would speak with someone re the after care from surgery         Communication Preference: Linda Braxton (wife): [phone]

## 2018-11-14 NOTE — TELEPHONE ENCOUNTER
Returned pts call. Spoke to pts wife. Reports doing well. Asked if possible could she remove masterson cath/voiding trial. She is a RN and knows how to do voiding trial and remove f/c. Will ask  and call her back.

## 2018-11-15 NOTE — TELEPHONE ENCOUNTER
Pt informed  would rather he come in for VT and eval of condition. Pt verbalized understanding. Confirmed appt for Monday for VT.

## 2018-11-19 ENCOUNTER — OFFICE VISIT (OUTPATIENT)
Dept: UROLOGY | Facility: CLINIC | Age: 68
End: 2018-11-19
Payer: MEDICARE

## 2018-11-19 VITALS
BODY MASS INDEX: 29.67 KG/M2 | WEIGHT: 195.75 LBS | SYSTOLIC BLOOD PRESSURE: 139 MMHG | DIASTOLIC BLOOD PRESSURE: 80 MMHG | HEART RATE: 89 BPM | HEIGHT: 68 IN

## 2018-11-19 DIAGNOSIS — N13.8 BPH WITH OBSTRUCTION/LOWER URINARY TRACT SYMPTOMS: ICD-10-CM

## 2018-11-19 DIAGNOSIS — D49.4 BLADDER TUMOR: ICD-10-CM

## 2018-11-19 DIAGNOSIS — Z46.6 ENCOUNTER FOR FOLEY CATHETER REMOVAL: Primary | ICD-10-CM

## 2018-11-19 DIAGNOSIS — N40.1 BPH WITH OBSTRUCTION/LOWER URINARY TRACT SYMPTOMS: ICD-10-CM

## 2018-11-19 PROCEDURE — 51700 IRRIGATION OF BLADDER: CPT | Mod: S$PBB,58,, | Performed by: PHYSICIAN ASSISTANT

## 2018-11-19 PROCEDURE — 99024 POSTOP FOLLOW-UP VISIT: CPT | Mod: POP,,, | Performed by: PHYSICIAN ASSISTANT

## 2018-11-19 PROCEDURE — 99213 OFFICE O/P EST LOW 20 MIN: CPT | Mod: PBBFAC | Performed by: PHYSICIAN ASSISTANT

## 2018-11-19 PROCEDURE — 99999 PR PBB SHADOW E&M-EST. PATIENT-LVL III: CPT | Mod: PBBFAC,,, | Performed by: PHYSICIAN ASSISTANT

## 2018-11-19 PROCEDURE — 51700 IRRIGATION OF BLADDER: CPT | Mod: PBBFAC | Performed by: PHYSICIAN ASSISTANT

## 2018-11-19 NOTE — PROGRESS NOTES
CHIEF COMPLAINT:    Mr. Braxton is a 68 y.o. male presenting for voiding trial.  PRESENTING ILLNESS:    Naresh Braxton is a 68 y.o. male with a PMH of hypogonadism, ED, bladder tumor who presents for a voiding trial.  He underwent the following procedure:    Procedure(s) Performed 11/13/18:   1. Transurethral destruction of prostate; radiofrequency thermotherapy  2.. Cystoscopy with bladder biopsy and fulguration  Staff Surgeon: Tree Cote MD   Findings:    1. Small papillary bladder tumor < 2 cm posterolateral to the right UO, biopsied and fulgurated  2. Trilobar hypertrophy  Drains:   1.  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  - Deeper levels examined.    His catheter has been draining well.  He is having bowel movements.  He denies fevers and chills.   He is taking macrobid given after the procedure.    He reports minute drainage from his penis following the procedure.    PATIENT HISTORY:    Past Medical History:   Diagnosis Date    Arthritis     GERD (gastroesophageal reflux disease)     Hernia     Hypertension     Renal stone     Wears glasses        Past Surgical History:   Procedure Laterality Date    APPENDECTOMY      CATHETERIZATION-URETHRAL N/A 3/16/2017    [facility]    COLONOSCOPY      CYSTOSCOPY WITH BIOPSY OF BLADDER N/A 11/13/2018    Procedure: CYSTOSCOPY, WITH BLADDER BIOPSY;  Surgeon: Tree Cote MD;  Location: [facility] OR [address] [place] [address];  Service: Urology;  Laterality: N/A;  30 min    CYSTOSCOPY WITH RETROGRADE PYELOGRAM Bilateral 3/16/2017    [facility] OR    CYSTOSCOPY, WITH BLADDER BIOPSY N/A 11/13/2018    [facility]ote [facility] at [facility] OR [address] [place] [address]    DESTRUCTION, PROSTATE, TRANSURETHRAL N/A 11/13/2018    Performed by [facility] at [facility] OR [address] [place] [address]    EXAM UNDER ANESTHESIA-DIGITAL-RECTAL N/A 3/16/2017    [facility]    FRACTURE SURGERY Right     foot    LITHOTRIPSY-LASER Left 3/16/2017    [facility] OR    MANIPULATION-STONE Left 3/16/2017    [facility] OR    PLACEMENT-STENT URETERAL Left 3/16/2017    [facility] OR    PROSTATE BIOPSY      TONSILLECTOMY         Family History   Problem Relation Age of Onset    Cancer Mother     Heart disease Father     Hypertension Brother        Social History     Socioeconomic History    Marital status:      Spouse name: Not on file    Number of children: Not on file    Years of education: Not on file    Highest education level: Not on file   Social Needs    Financial resource strain: Not on file    Food insecurity - worry: Not on file    Food insecurity - inability: Not on file    Transportation needs - medical: Not on file    Transportation needs - non-medical: Not on file   Occupational History    Not on file   Tobacco Use    Smoking status: Never Smoker    Smokeless tobacco: Never Used   Substance and Sexual Activity    Alcohol use: Yes    Drug use: Not on file    Sexual activity: Not on file   Other Topics Concern    Not on file   Social History Narrative    Not on file       Allergies:  Patient has no known allergies.    Medications:    Current Outpatient Medications:     aspirin 81 mg Tab, Take by mouth.  , Disp: , Rfl:     ESOMEPRAZOLE MAGNESIUM (NEXIUM ORAL), Take 40 mg by mouth once daily. , Disp: , Rfl:     fluticasone (FLONASE) 50 mcg/actuation nasal spray, 1 spray by Nasal route daily as needed. , Disp: , Rfl:     ibuprofen (ADVIL,MOTRIN) 400 MG tablet, Take 1 tablet (400 mg total) by mouth 3 (three) times daily. for 14 days, Disp: 42 tablet, Rfl: 0    losartan-hydrochlorothiazide 50-12.5 mg (HYZAAR) 50-12.5 mg per tablet, Take 1 tablet by mouth once daily., Disp: , Rfl:     multivitamin (THERAGRAN) per tablet, Take 1  tablet by mouth once daily., Disp: , Rfl:     nitrofurantoin (MACRODANTIN) 50 MG capsule, Take 1 capsule (50 mg total) by mouth every evening. for 7 days, Disp: 7 capsule, Rfl: 0    oxybutynin (DITROPAN) 5 MG Tab, Take 1 tablet (5 mg total) by mouth 3 (three) times daily as needed (bladder spasms)., Disp: 21 tablet, Rfl: 0    phenazopyridine (AZO STANDARD MAXIMUM STRENGTH) 97.5 mg Tab, Take 2 tablets by mouth 3 (three) times daily as needed (painful urination)., Disp: 21 each, Rfl: 0    tadalafil (CIALIS) 20 MG Tab, Take 1 tablet (20 mg total) by mouth daily as needed. Take 1 hour before intercourse, Disp: 10 tablet, Rfl: 11    TAMSULOSIN HCL (FLOMAX ORAL), Take 0.4 mg by mouth once daily., Disp: , Rfl:     testosterone (ANDROGEL) 20.25 mg/1.25 gram (1.62 %) GlPm, Apply 2 pumps to shoulders daily, Disp: 1 Bottle, Rfl: 5    traMADol (ULTRAM) 50 mg tablet, Take 1 tablet (50 mg total) by mouth every 4 (four) hours as needed for Pain., Disp: 8 tablet, Rfl: 0    ZOLPIDEM TARTRATE (AMBIEN ORAL), Take 1 tablet by mouth nightly as needed. , Disp: , Rfl:     Current Facility-Administered Medications:     lidocaine HCl 2% urojet, , Urethral, Once, Tree Cote MD    PHYSICAL EXAMINATION:    Constitutional: He appears well-developed and well-nourished.  He is in no apparent distress.    Eyes: No scleral icterus noted bilaterally. No discharge bilaterally.    Nose: No rhinorrhea    Cardiovascular: Normal rate.      Pulmonary/Chest: Effort normal. No respiratory distress.     Abdominal:  He exhibits no distension.      Neurological: He is alert and oriented to person, place, and time.     Skin: Skin is warm and dry.     Psych: Cooperative with normal affect.    Shelton catheter in place with yellow urine noted in bag.      Physical Exam      LABS:    No results found for: PSA, PSADIAG, PSATOTAL, PSAFREE, PSAFREEPCT    IMPRESSION:    Encounter Diagnoses   Name Primary?    BPH with obstruction/lower urinary tract  symptoms Yes    Encounter for Shelton catheter removal     Bladder tumor          PLAN:    Voiding trial performed by Nurse Giovana.  155ml of sterile water was instilled into bladder.  Shelton catheter was removed. Patient urinated 225ml without difficulty.      Voiding trial passed    Patient notified of pathology results.    He will follow up in 3 months with Dr. Cote.      My Phan PA-C

## 2018-11-21 ENCOUNTER — TELEPHONE (OUTPATIENT)
Dept: UROLOGY | Facility: CLINIC | Age: 68
End: 2018-11-21

## 2018-11-21 NOTE — TELEPHONE ENCOUNTER
Spoke to pt, pt reports having to go to ER in [place] last night due to not being able to urinate. Pt is doing well now. Has appt next week with . Will check to see if f/c can be d/c'd then. Pt instructed to use aquaphor to penis to prevent irritation to penis. Pt verbalized understanding.

## 2018-11-21 NOTE — TELEPHONE ENCOUNTER
----- Message from Martine Santos sent at 2018  8:02 AM CST -----  Contact: Wife- Linda- [phone]  Kera- pts wife called to determine if he should continue taking the rx nitrofurantoin (MACRODANTIN) 50 MG capsule ()- also would like to get rx for lidocaine jelly- pt uses [facility] on file- pt also has a catheter that will need to be removed during post op on - please contact Linda at [phone]

## 2018-11-29 ENCOUNTER — OFFICE VISIT (OUTPATIENT)
Dept: UROLOGY | Facility: CLINIC | Age: 68
End: 2018-11-29
Payer: MEDICARE

## 2018-11-29 VITALS
HEIGHT: 68 IN | HEART RATE: 70 BPM | WEIGHT: 193.13 LBS | SYSTOLIC BLOOD PRESSURE: 140 MMHG | BODY MASS INDEX: 29.27 KG/M2 | DIASTOLIC BLOOD PRESSURE: 94 MMHG

## 2018-11-29 DIAGNOSIS — N40.1 BPH WITH URINARY OBSTRUCTION: Primary | ICD-10-CM

## 2018-11-29 DIAGNOSIS — E29.1 MALE HYPOGONADISM: ICD-10-CM

## 2018-11-29 DIAGNOSIS — N52.01 ERECTILE DYSFUNCTION DUE TO ARTERIAL INSUFFICIENCY: ICD-10-CM

## 2018-11-29 DIAGNOSIS — C67.2 MALIGNANT NEOPLASM OF LATERAL WALL OF BLADDER: ICD-10-CM

## 2018-11-29 DIAGNOSIS — Z79.899 ENCOUNTER FOR LONG-TERM (CURRENT) USE OF HIGH-RISK MEDICATION: ICD-10-CM

## 2018-11-29 DIAGNOSIS — I10 ESSENTIAL HYPERTENSION: ICD-10-CM

## 2018-11-29 DIAGNOSIS — N13.8 BPH WITH URINARY OBSTRUCTION: Primary | ICD-10-CM

## 2018-11-29 PROBLEM — N32.9 LESION OF BLADDER: Status: RESOLVED | Noted: 2018-11-13 | Resolved: 2018-11-29

## 2018-11-29 PROCEDURE — 99024 POSTOP FOLLOW-UP VISIT: CPT | Mod: POP,,, | Performed by: UROLOGY

## 2018-11-29 PROCEDURE — 99213 OFFICE O/P EST LOW 20 MIN: CPT | Mod: PBBFAC | Performed by: UROLOGY

## 2018-11-29 PROCEDURE — 99999 PR PBB SHADOW E&M-EST. PATIENT-LVL III: CPT | Mod: PBBFAC,,, | Performed by: UROLOGY

## 2018-11-29 NOTE — LETTER
November 29, 2018      Karl Norris III, MD  [address] [facility] [address]           [facility] - Urology 4th Floor  [address]  [place] [address]  Phone: [phone]          Patient: Naresh Braxton   MR Number: [id number]   YOB: 1950   Date of Visit: 11/29/2018       Dear Dr. Karl Nroris III:    Thank you for referring Naresh Braxton to me for evaluation. Attached you will find relevant portions of my assessment and plan of care.    If you have questions, please do not hesitate to call me. I look forward to following Naresh Braxton along with you.    Sincerely,    Tree Cote MD    Enclosure  CC:  No Recipients    If you would like to receive this communication electronically, please contact [email or web address] or [phone] to request more information on [employer] Link access.    For providers and/or their staff who would like to refer a patient to [facility], please contact us through our one-stop-shop provider referral line, [facility] , at [phone].    If you feel you have received this communication in error or would no longer like to receive these types of communications, please e-mail [email or web address]

## 2018-11-29 NOTE — PROGRESS NOTES
CHIEF COMPLAINT:    Mr. Braxton is a 68 y.o. male presenting with ED and LUTS.    PRESENTING ILLNESS:    Naresh Braxton is a 68 y.o. male who's son is friends with Chente Cote who c/o LUTS.  He's s/p Rezum on 11/13/18.  He's also s/p a bladder biopsy and fulguration for a very small 1 mm papillary bladder lesion.  Path showed PUNLUMP.  Post op, he went into AUR.    He has ED.  This has been present for > 1 year.  He gets good results with Cialis.    He has a history of hypogonadism.  Managed by his PCP with IM TRT.  He didn't like the shots.  Currently on Androgel 1.62% using 2 pumps.    REVIEW OF SYSTEMS:    Naresh Braxton denies headache, blurred vision, fever, nausea, vomiting, chills, abdominal pain, bleeding per rectum, cough, SOB, recent loss of consciousness, recent mental status changes, seizures, dizziness, or upper or lower extremity weakness.    YIMI  1. 2  2. 3  3. 3  4. 4  5. 4     PATIENT HISTORY:    Past Medical History:   Diagnosis Date    Arthritis     GERD (gastroesophageal reflux disease)     Hernia     Hypertension     Renal stone     Wears glasses        Past Surgical History:   Procedure Laterality Date    APPENDECTOMY      CATHETERIZATION-URETHRAL N/A 3/16/2017    Per[facility] at [facility] OR    COLONOSCOPY      CYSTOSCOPY WITH BIOPSY OF BLADDER N/A 11/13/2018    Procedure: CYSTOSCOPY, WITH BLADDER BIOPSY;  Surgeon: Tree Cote MD;  Location: [facility] OR [address] [place] [address];  Service: Urology;  Laterality: N/A;  30 min    CYSTOSCOPY WITH RETROGRADE PYELOGRAM Bilateral 3/16/2017    Per[facility] by [facility] at [facility] OR    CYSTOSCOPY, WITH BLADDER BIOPSY N/A 11/13/2018    Performed by Tree Cote MD at [facility] OR [address] [place] [address]    DESTRUCTION, PROSTATE, TRANSURETHRAL N/A 11/13/2018    Performed by Tree Cote MD at [facility] OR [address] [place] [address]    EXAM UNDER ANESTHESIA-DIGITAL-RECTAL N/A 3/16/2017    Per[facility] at [facility] OR    FRACTURE SURGERY Right     foot     LITHOTRIPSY-LASER Left 3/16/2017    [facility] OR    MANIPULATION-STONE Left 3/16/2017    [facility] at [facility] OR    PLACEMENT-STENT URETERAL Left 3/16/2017    Per[facility] at [facility] OR    PROSTATE BIOPSY      TONSILLECTOMY         Family History   Problem Relation Age of Onset    Cancer Mother     Heart disease Father     Hypertension Brother        Social History     Socioeconomic History    Marital status:      Spouse name: Not on file    Number of children: Not on file    Years of education: Not on file    Highest education level: Not on file   Social Needs    Financial resource strain: Not on file    Food insecurity - worry: Not on file    Food insecurity - inability: Not on file    Transportation needs - medical: Not on file    Transportation needs - non-medical: Not on file   Occupational History    Not on file   Tobacco Use    Smoking status: Never Smoker    Smokeless tobacco: Never Used   Substance and Sexual Activity    Alcohol use: Yes    Drug use: Not on file    Sexual activity: Not on file   Other Topics Concern    Not on file   Social History Narrative    Not on file       Allergies:  Patient has no known allergies.    Medications:    Current Outpatient Medications:     aspirin 81 mg Tab, Take by mouth.  , Disp: , Rfl:     ESOMEPRAZOLE MAGNESIUM (NEXIUM ORAL), Take 40 mg by mouth once daily. , Disp: , Rfl:     fluticasone (FLONASE) 50 mcg/actuation nasal spray, 1 spray by Nasal route daily as needed. , Disp: , Rfl:     losartan-hydrochlorothiazide 50-12.5 mg (HYZAAR) 50-12.5 mg per tablet, Take 1 tablet by mouth once daily., Disp: , Rfl:     multivitamin (THERAGRAN) per tablet, Take 1 tablet by mouth once daily., Disp: , Rfl:     oxybutynin (DITROPAN) 5 MG Tab, Take 1 tablet (5 mg total) by mouth 3 (three) times daily as needed (bladder spasms)., Disp: 21 tablet, Rfl: 0    tadalafil (CIALIS) 20 MG  Tab, Take 1 tablet (20 mg total) by mouth daily as needed. Take 1 hour before intercourse, Disp: 10 tablet, Rfl: 11    TAMSULOSIN HCL (FLOMAX ORAL), Take 0.4 mg by mouth once daily., Disp: , Rfl:     testosterone (ANDROGEL) 20.25 mg/1.25 gram (1.62 %) GlPm, Apply 2 pumps to shoulders daily, Disp: 1 Bottle, Rfl: 5    traMADol (ULTRAM) 50 mg tablet, Take 1 tablet (50 mg total) by mouth every 4 (four) hours as needed for Pain., Disp: 8 tablet, Rfl: 0    ZOLPIDEM TARTRATE (AMBIEN ORAL), Take 1 tablet by mouth nightly as needed. , Disp: , Rfl:     Current Facility-Administered Medications:     lidocaine HCl 2% urojet, , Urethral, Once, Tree Cote MD    PHYSICAL EXAMINATION:    The patient generally appears in good health, is appropriately interactive, and is in no apparent distress.     Eyes: anicteric sclerae, moist conjunctivae; no lid-lag; PERRLA     HENT: Atraumatic; oropharynx clear with moist mucous membranes and no mucosal ulcerations;normal hard and soft palate.  No evidence of lymphadenopathy.    Neck: Trachea midline.  No thyromegaly.    Musculoskeletal: No abnormal gait.    Skin: No lesions.    Mental: Cooperative with normal affect.  Is oriented to time, place, and person.    Neuro: Grossly intact.    Chest: Normal inspiratory effort.   No accessory muscles.  No audible wheezes.  Respirations symmetric on inspiration and expiration.    Heart: Regular rhythm.      Abdomen:  Soft, non-tender. No masses or organomegaly. Bladder is not palpable. No evidence of flank discomfort. No evidence of inguinal hernia.    Genitourinary: The penis is circumcised with no evidence of plaques or induration. The urethral meatus is normal. The testes, epididymides, and cord structures are normal in size and contour bilaterally. The scrotum is normal in size and contour.    Normal anal sphincter tone. No rectal mass.    The prostate is 40 g. Normal landmarks. Lateral sulci. Median furrow intact.  No nodularity or  induration. Seminal vesicles are normal.    Extremities: No clubbing, cyanosis, or edema      LABS:      No results found for: PSA, PSADIAG, PSATOTAL, PSAFREE, PSAFREEPCT    IMPRESSION:    Encounter Diagnoses   Name Primary?    BPH with urinary obstruction Yes    Malignant neoplasm of lateral wall of bladder     Erectile dysfunction due to arterial insufficiency     Essential hypertension     Male hypogonadism     Encounter for long-term (current) use of high-risk medication    HTN, controlled      PLAN:    1. Continue Cialis for the ED.   2. Continue Androgel 1.62%  3. Voiding trial done today that he passed.  4. RTC 1 month with T, PSA, CBC, hepatic panel, lipid panel.    5. Will schedule cysto at his next appt for the bladder tumor      Copy to:

## 2018-12-05 ENCOUNTER — TELEPHONE (OUTPATIENT)
Dept: UROLOGY | Facility: CLINIC | Age: 68
End: 2018-12-05

## 2018-12-05 DIAGNOSIS — N13.8 BPH WITH URINARY OBSTRUCTION: Primary | ICD-10-CM

## 2018-12-05 DIAGNOSIS — R30.0 BURNING WITH URINATION: ICD-10-CM

## 2018-12-05 DIAGNOSIS — N40.1 BPH WITH URINARY OBSTRUCTION: Primary | ICD-10-CM

## 2018-12-05 NOTE — TELEPHONE ENCOUNTER
Spoke to pt. Pt reports he thinks he has a uti. He has constant burning, that doesn't go away. It is keeping him up at night. He is taking otc azo which helps but doesn't relieve the burning. Pt also bought an otc test for uti. Pt reports it was positive for wbc and nitrite. Pt denies fever, urine is discolored from taking azo. He denies odor to urine. He reports drinking lots of fluids. Instructed pt I will notify  and call him back after. Pt verbalized understanding.

## 2018-12-05 NOTE — TELEPHONE ENCOUNTER
----- Message from Elina Mcmahan MA sent at 12/5/2018  9:49 AM CST -----  Contact: Mrs. Braxton  spouse  [phone]  Needs Advice    Reason for call:  He is with burning upon urination, itching and frequency.          Communication Preference:  Phone# above    Additional Information:  We live out of town and would like to have the urine test done with his PCP locally.  This started not long after the catheter removal

## 2018-12-06 ENCOUNTER — LAB VISIT (OUTPATIENT)
Dept: LAB | Facility: HOSPITAL | Age: 68
End: 2018-12-06
Attending: UROLOGY
Payer: MEDICARE

## 2018-12-06 DIAGNOSIS — R30.0 BURNING WITH URINATION: ICD-10-CM

## 2018-12-06 PROCEDURE — 87077 CULTURE AEROBIC IDENTIFY: CPT

## 2018-12-06 PROCEDURE — 87186 SC STD MICRODIL/AGAR DIL: CPT

## 2018-12-06 PROCEDURE — 87088 URINE BACTERIA CULTURE: CPT

## 2018-12-06 PROCEDURE — 87086 URINE CULTURE/COLONY COUNT: CPT

## 2018-12-06 NOTE — TELEPHONE ENCOUNTER
----- Message from Tree Cote MD sent at 12/6/2018 10:31 AM CST -----  Azo stains everything and makes dipstick tests not accurate.  Have him see an GERSON to check his urine and send a culture.    Tree    ----- Message -----  From: Sharron Veloz LPN  Sent: 12/5/2018   4:39 PM  To: MD William Rojas,  Pt reports he thinks he has a uti. He has constant burning, that doesn't go away. It is keeping him up at night. He is taking otc azo which helps but doesn't relieve the burning. Pt also bought an otc test for uti. Pt reports it was positive for wbc and nitrite. Pt denies fever, urine is discolored from taking azo. He denies odor to urine.   Pt is 3 weeks post op rezum. F/c removed on Monday. He is taking ditropan and flomax.     Please advise,  Sharron

## 2018-12-08 LAB — BACTERIA UR CULT: NORMAL

## 2018-12-10 ENCOUNTER — TELEPHONE (OUTPATIENT)
Dept: UROLOGY | Facility: CLINIC | Age: 68
End: 2018-12-10

## 2018-12-10 RX ORDER — SULFAMETHOXAZOLE AND TRIMETHOPRIM 800; 160 MG/1; MG/1
1 TABLET ORAL 2 TIMES DAILY
Qty: 14 TABLET | Refills: 0 | Status: SHIPPED | OUTPATIENT
Start: 2018-12-10 | End: 2018-12-17

## 2019-01-10 ENCOUNTER — LAB VISIT (OUTPATIENT)
Dept: LAB | Facility: HOSPITAL | Age: 69
End: 2019-01-10
Attending: UROLOGY
Payer: MEDICARE

## 2019-01-10 DIAGNOSIS — N40.1 BPH WITH URINARY OBSTRUCTION: ICD-10-CM

## 2019-01-10 DIAGNOSIS — E29.1 MALE HYPOGONADISM: ICD-10-CM

## 2019-01-10 DIAGNOSIS — N13.8 BPH WITH URINARY OBSTRUCTION: ICD-10-CM

## 2019-01-10 DIAGNOSIS — Z79.899 ENCOUNTER FOR LONG-TERM (CURRENT) USE OF HIGH-RISK MEDICATION: ICD-10-CM

## 2019-01-10 LAB
ALBUMIN SERPL BCP-MCNC: 4.1 G/DL
ALP SERPL-CCNC: 71 U/L
ALT SERPL W/O P-5'-P-CCNC: 18 U/L
AST SERPL-CCNC: 14 U/L
BASOPHILS # BLD AUTO: 0.05 K/UL
BASOPHILS NFR BLD: 0.9 %
BILIRUB DIRECT SERPL-MCNC: 0.2 MG/DL
BILIRUB SERPL-MCNC: 0.4 MG/DL
DIFFERENTIAL METHOD: NORMAL
EOSINOPHIL # BLD AUTO: 0.2 K/UL
EOSINOPHIL NFR BLD: 3.9 %
ERYTHROCYTE [DISTWIDTH] IN BLOOD BY AUTOMATED COUNT: 14.4 %
HCT VFR BLD AUTO: 45.4 %
HGB BLD-MCNC: 15 G/DL
LYMPHOCYTES # BLD AUTO: 1.8 K/UL
LYMPHOCYTES NFR BLD: 33.3 %
MCH RBC QN AUTO: 29.9 PG
MCHC RBC AUTO-ENTMCNC: 33 G/DL
MCV RBC AUTO: 90 FL
MONOCYTES # BLD AUTO: 0.7 K/UL
MONOCYTES NFR BLD: 12.3 %
NEUTROPHILS # BLD AUTO: 2.7 K/UL
NEUTROPHILS NFR BLD: 49.2 %
PLATELET # BLD AUTO: 187 K/UL
PMV BLD AUTO: 9.6 FL
PROT SERPL-MCNC: 7.4 G/DL
RBC # BLD AUTO: 5.02 M/UL
WBC # BLD AUTO: 5.44 K/UL

## 2019-01-10 PROCEDURE — 84403 ASSAY OF TOTAL TESTOSTERONE: CPT

## 2019-01-10 PROCEDURE — 85025 COMPLETE CBC W/AUTO DIFF WBC: CPT | Mod: PO,ER

## 2019-01-10 PROCEDURE — 36415 COLL VENOUS BLD VENIPUNCTURE: CPT | Mod: PO,ER

## 2019-01-10 PROCEDURE — 80061 LIPID PANEL: CPT

## 2019-01-10 PROCEDURE — 80076 HEPATIC FUNCTION PANEL: CPT | Mod: PO,ER

## 2019-01-10 PROCEDURE — 84153 ASSAY OF PSA TOTAL: CPT

## 2019-01-11 LAB
CHOLEST SERPL-MCNC: 187 MG/DL
CHOLEST/HDLC SERPL: 3.6 {RATIO}
COMPLEXED PSA SERPL-MCNC: 3 NG/ML
HDLC SERPL-MCNC: 52 MG/DL
HDLC SERPL: 27.8 %
LDLC SERPL CALC-MCNC: 111.2 MG/DL
NONHDLC SERPL-MCNC: 135 MG/DL
TESTOST SERPL-MCNC: 873 NG/DL
TRIGL SERPL-MCNC: 119 MG/DL

## 2019-01-16 ENCOUNTER — OFFICE VISIT (OUTPATIENT)
Dept: UROLOGY | Facility: CLINIC | Age: 69
End: 2019-01-16
Payer: MEDICARE

## 2019-01-16 VITALS
WEIGHT: 194.88 LBS | SYSTOLIC BLOOD PRESSURE: 110 MMHG | DIASTOLIC BLOOD PRESSURE: 81 MMHG | BODY MASS INDEX: 29.54 KG/M2 | HEART RATE: 84 BPM | HEIGHT: 68 IN

## 2019-01-16 DIAGNOSIS — Z79.899 ENCOUNTER FOR LONG-TERM (CURRENT) USE OF HIGH-RISK MEDICATION: ICD-10-CM

## 2019-01-16 DIAGNOSIS — N52.01 ERECTILE DYSFUNCTION DUE TO ARTERIAL INSUFFICIENCY: ICD-10-CM

## 2019-01-16 DIAGNOSIS — I10 ESSENTIAL HYPERTENSION: ICD-10-CM

## 2019-01-16 DIAGNOSIS — N40.1 BPH WITH URINARY OBSTRUCTION: ICD-10-CM

## 2019-01-16 DIAGNOSIS — E29.1 MALE HYPOGONADISM: Primary | ICD-10-CM

## 2019-01-16 DIAGNOSIS — N13.8 BPH WITH URINARY OBSTRUCTION: ICD-10-CM

## 2019-01-16 DIAGNOSIS — C67.2 MALIGNANT NEOPLASM OF LATERAL WALL OF BLADDER: ICD-10-CM

## 2019-01-16 PROCEDURE — 99214 PR OFFICE/OUTPT VISIT, EST, LEVL IV, 30-39 MIN: ICD-10-PCS | Mod: S$PBB,24,, | Performed by: UROLOGY

## 2019-01-16 PROCEDURE — 99214 OFFICE O/P EST MOD 30 MIN: CPT | Mod: S$PBB,24,, | Performed by: UROLOGY

## 2019-01-16 PROCEDURE — 99999 PR PBB SHADOW E&M-EST. PATIENT-LVL III: CPT | Mod: PBBFAC,,, | Performed by: UROLOGY

## 2019-01-16 PROCEDURE — 99213 OFFICE O/P EST LOW 20 MIN: CPT | Mod: PBBFAC | Performed by: UROLOGY

## 2019-01-16 PROCEDURE — 99999 PR PBB SHADOW E&M-EST. PATIENT-LVL III: ICD-10-PCS | Mod: PBBFAC,,, | Performed by: UROLOGY

## 2019-01-16 RX ORDER — PROMETHAZINE HYDROCHLORIDE AND CODEINE PHOSPHATE 6.25; 1 MG/5ML; MG/5ML
SOLUTION ORAL
Refills: 0 | COMMUNITY
Start: 2019-01-08 | End: 2022-04-27 | Stop reason: ALTCHOICE

## 2019-01-16 RX ORDER — LIDOCAINE HYDROCHLORIDE 20 MG/ML
JELLY TOPICAL ONCE
Status: CANCELLED | OUTPATIENT
Start: 2019-01-16 | End: 2019-01-16

## 2019-01-16 RX ORDER — LEVOFLOXACIN 500 MG/1
TABLET, FILM COATED ORAL
Refills: 0 | COMMUNITY
Start: 2019-01-08 | End: 2022-04-27 | Stop reason: ALTCHOICE

## 2019-01-16 NOTE — PROGRESS NOTES
CHIEF COMPLAINT:    Mr. Braxton is a 68 y.o. male presenting with ED and LUTS.    PRESENTING ILLNESS:    Naresh Braxton is a 68 y.o. male who's son is friends with Chente Cote who c/o LUTS.  He's s/p Rezum on 11/13/18.  He's voiding very well.    He's also s/p a bladder biopsy and fulguration for a very small 1 mm papillary bladder lesion.  Path showed PUNLUMP.  Post op, he went into AUR.    He has ED.  This has been present for > 1 year.  He gets good results with Cialis.    He has a history of hypogonadism.  Managed by his PCP with IM TRT.  He didn't like the shots.  Currently on Androgel 1.62% using 2 pumps.  While on TRT, he's noticed no change in his energy levels, erections or libido.    REVIEW OF SYSTEMS:    Naresh Braxton denies headache, blurred vision, fever, nausea, vomiting, chills, abdominal pain, bleeding per rectum, cough, SOB, recent loss of consciousness, recent mental status changes, seizures, dizziness, or upper or lower extremity weakness.    YIMI  1. 2  2. 4  3. 4  4. 4  5. 4     PATIENT HISTORY:    Past Medical History:   Diagnosis Date    Arthritis     GERD (gastroesophageal reflux disease)     Hernia     Hypertension     Renal stone     Wears glasses        Past Surgical History:   Procedure Laterality Date    APPENDECTOMY      CATHETERIZATION-URETHRAL N/A 3/16/2017    [facility] OR    COLONOSCOPY      CYSTOSCOPY WITH RETROGRADE PYELOGRAM Bilateral 3/16/2017    [facility] at [facility] OR    CYSTOSCOPY, WITH BLADDER BIOPSY N/A 11/13/2018    [facility] OR [facility]    DESTRUCTION, PROSTATE, TRANSURETHRAL N/A 11/13/2018    [facility] at [facility]    EXAM UNDER ANESTHESIA-DIGITAL-RECTAL N/A 3/16/2017    [facility] at [facility] OR    FRACTURE SURGERY Right     foot    LITHOTRIPSY-LASER Left 3/16/2017    [facility] at [facility] OR     MANIPULATION-STONE Left 3/16/2017    [facility]    PLACEMENT-STENT URETERAL Left 3/16/2017    [facility] OR    PROSTATE BIOPSY      TONSILLECTOMY         Family History   Problem Relation Age of Onset    Cancer Mother     Heart disease Father     Hypertension Brother        Social History     Socioeconomic History    Marital status:      Spouse name: Not on file    Number of children: Not on file    Years of education: Not on file    Highest education level: Not on file   Social Needs    Financial resource strain: Not on file    Food insecurity - worry: Not on file    Food insecurity - inability: Not on file    Transportation needs - medical: Not on file    Transportation needs - non-medical: Not on file   Occupational History    Not on file   Tobacco Use    Smoking status: Never Smoker    Smokeless tobacco: Never Used   Substance and Sexual Activity    Alcohol use: Yes    Drug use: Not on file    Sexual activity: Not on file   Other Topics Concern    Not on file   Social History Narrative    Not on file       Allergies:  Patient has no known allergies.    Medications:    Current Outpatient Medications:     aspirin 81 mg Tab, Take by mouth.  , Disp: , Rfl:     ESOMEPRAZOLE MAGNESIUM (NEXIUM ORAL), Take 40 mg by mouth once daily. , Disp: , Rfl:     fluticasone (FLONASE) 50 mcg/actuation nasal spray, 1 spray by Nasal route daily as needed. , Disp: , Rfl:     losartan-hydrochlorothiazide 50-12.5 mg (HYZAAR) 50-12.5 mg per tablet, Take 1 tablet by mouth once daily., Disp: , Rfl:     multivitamin (THERAGRAN) per tablet, Take 1 tablet by mouth once daily., Disp: , Rfl:     oxybutynin (DITROPAN) 5 MG Tab, Take 1 tablet (5 mg total) by mouth 3 (three) times daily as needed (bladder spasms)., Disp: 21 tablet, Rfl: 0    tadalafil (CIALIS) 20 MG Tab, Take 1 tablet (20 mg total) by mouth daily as needed. Take 1 hour before  intercourse, Disp: 10 tablet, Rfl: 11    TAMSULOSIN HCL (FLOMAX ORAL), Take 0.4 mg by mouth once daily., Disp: , Rfl:     testosterone (ANDROGEL) 20.25 mg/1.25 gram (1.62 %) GlPm, Apply 2 pumps to shoulders daily, Disp: 1 Bottle, Rfl: 5    traMADol (ULTRAM) 50 mg tablet, Take 1 tablet (50 mg total) by mouth every 4 (four) hours as needed for Pain., Disp: 8 tablet, Rfl: 0    ZOLPIDEM TARTRATE (AMBIEN ORAL), Take 1 tablet by mouth nightly as needed. , Disp: , Rfl:     Current Facility-Administered Medications:     lidocaine HCl 2% urojet, , Urethral, Once, Tree Cote MD    PHYSICAL EXAMINATION:    The patient generally appears in good health, is appropriately interactive, and is in no apparent distress.     Eyes: anicteric sclerae, moist conjunctivae; no lid-lag; PERRLA     HENT: Atraumatic; oropharynx clear with moist mucous membranes and no mucosal ulcerations;normal hard and soft palate.  No evidence of lymphadenopathy.    Neck: Trachea midline.  No thyromegaly.    Musculoskeletal: No abnormal gait.    Skin: No lesions.    Mental: Cooperative with normal affect.  Is oriented to time, place, and person.    Neuro: Grossly intact.    Chest: Normal inspiratory effort.   No accessory muscles.  No audible wheezes.  Respirations symmetric on inspiration and expiration.    Heart: Regular rhythm.      Abdomen:  Soft, non-tender. No masses or organomegaly. Bladder is not palpable. No evidence of flank discomfort. No evidence of inguinal hernia.    Genitourinary: The penis is circumcised with no evidence of plaques or induration. The urethral meatus is normal. The testes, epididymides, and cord structures are normal in size and contour bilaterally. The scrotum is normal in size and contour.    Normal anal sphincter tone. No rectal mass.    The prostate is 40 g. Normal landmarks. Lateral sulci. Median furrow intact.  No nodularity or induration. Seminal vesicles are normal.    Extremities: No clubbing, cyanosis, or  edema      LABS:      Lab Results   Component Value Date    PSADIAG 3.0 01/10/2019       IMPRESSION:    Encounter Diagnoses   Name Primary?    Male hypogonadism Yes    BPH with urinary obstruction     Erectile dysfunction due to arterial insufficiency     Essential hypertension     Malignant neoplasm of lateral wall of bladder     Encounter for long-term (current) use of high-risk medication    HTN, controlled      PLAN:    1. Continue Cialis for the ED.   Refilled today.  2. Will stop TRT as he's not getting a benefit.  3. Will cysto for the bladder tumor.      Copy to:

## 2019-01-30 ENCOUNTER — PATIENT MESSAGE (OUTPATIENT)
Dept: UROLOGY | Facility: CLINIC | Age: 69
End: 2019-01-30

## 2019-02-19 ENCOUNTER — PATIENT MESSAGE (OUTPATIENT)
Dept: UROLOGY | Facility: CLINIC | Age: 69
End: 2019-02-19

## 2019-02-22 ENCOUNTER — HOSPITAL ENCOUNTER (OUTPATIENT)
Dept: UROLOGY | Facility: HOSPITAL | Age: 69
Discharge: HOME | End: 2019-02-22
Attending: UROLOGY
Payer: MEDICARE

## 2019-02-22 VITALS
WEIGHT: 194.88 LBS | BODY MASS INDEX: 29.54 KG/M2 | HEART RATE: 69 BPM | RESPIRATION RATE: 16 BRPM | HEIGHT: 68 IN | TEMPERATURE: 99 F | DIASTOLIC BLOOD PRESSURE: 86 MMHG | SYSTOLIC BLOOD PRESSURE: 198 MMHG

## 2019-02-22 DIAGNOSIS — C67.2 MALIGNANT NEOPLASM OF LATERAL WALL OF BLADDER: ICD-10-CM

## 2019-02-22 PROCEDURE — 52000 PR CYSTOURETHROSCOPY: ICD-10-PCS | Mod: ,,, | Performed by: UROLOGY

## 2019-02-22 PROCEDURE — 52000 CYSTOURETHROSCOPY: CPT

## 2019-02-22 PROCEDURE — 52000 CYSTOURETHROSCOPY: CPT | Mod: ,,, | Performed by: UROLOGY

## 2019-02-22 RX ORDER — LIDOCAINE HYDROCHLORIDE 20 MG/ML
JELLY TOPICAL ONCE
Status: COMPLETED | OUTPATIENT
Start: 2019-02-22 | End: 2019-02-22

## 2019-02-22 RX ADMIN — LIDOCAINE HYDROCHLORIDE: 20 JELLY TOPICAL at 10:02

## 2019-02-22 NOTE — PATIENT INSTRUCTIONS
What to Expect After a Cystoscopy  For the next 24-48 hours, you may feel a mild burning when you urinate. This burning is normal and expected. Drink plenty of water to dilute the urine to help relieve the burning sensation. You may also see a small amount of blood in your urine after the procedure.    Unless you are already taking antibiotics, you may be given an antibiotic after the test to prevent infection.    Signs and Symptoms to Report  Call the [facility] at [phone] if you develop any of the following:  · Fever of 101 degrees or higher  · Chills or persistent bleeding  · Inability to urinate

## 2019-02-22 NOTE — PROCEDURES
Date: 02/22/2019     Surgeon: Kera    Assistant: None    Procedure performed: cystoscopy    Blood loss: None    Specimen: None    Procedure in detail: Using standard sterile technique, the flexible cystoscope was assembled and passed into the patient's bladder.  Cystoscopic evaluation of the bladder revealed no abnormalities.  The estimated prostatic length was 4.5 cm.

## 2019-05-23 ENCOUNTER — OFFICE VISIT (OUTPATIENT)
Dept: OPHTHALMOLOGY | Facility: CLINIC | Age: 69
End: 2019-05-23
Payer: MEDICARE

## 2019-05-23 DIAGNOSIS — H52.7 REFRACTIVE ERROR: ICD-10-CM

## 2019-05-23 DIAGNOSIS — Z13.5 GLAUCOMA SCREENING: ICD-10-CM

## 2019-05-23 DIAGNOSIS — H25.13 CATARACT, NUCLEAR SCLEROTIC SENILE, BILATERAL: Primary | ICD-10-CM

## 2019-05-23 PROCEDURE — 92015 DETERMINE REFRACTIVE STATE: CPT | Mod: ,,, | Performed by: OPTOMETRIST

## 2019-05-23 PROCEDURE — 92014 COMPRE OPH EXAM EST PT 1/>: CPT | Mod: S$PBB,,, | Performed by: OPTOMETRIST

## 2019-05-23 PROCEDURE — 92015 PR REFRACTION: ICD-10-PCS | Mod: ,,, | Performed by: OPTOMETRIST

## 2019-05-23 PROCEDURE — 92014 PR EYE EXAM, EST PATIENT,COMPREHESV: ICD-10-PCS | Mod: S$PBB,,, | Performed by: OPTOMETRIST

## 2019-05-23 PROCEDURE — 99999 PR PBB SHADOW E&M-EST. PATIENT-LVL II: CPT | Mod: PBBFAC,,, | Performed by: OPTOMETRIST

## 2019-05-23 PROCEDURE — 99999 PR PBB SHADOW E&M-EST. PATIENT-LVL II: ICD-10-PCS | Mod: PBBFAC,,, | Performed by: OPTOMETRIST

## 2019-05-23 PROCEDURE — 99212 OFFICE O/P EST SF 10 MIN: CPT | Mod: PBBFAC,PN | Performed by: OPTOMETRIST

## 2019-05-23 NOTE — PROGRESS NOTES
HPI     Last [facility] exam 06/21/2016  Screening for glaucoma  RE  Decreased distance and near vision     Last edited by Fransico Disla MA on 5/23/2019  9:17 AM. (History)            Assessment /Plan     For exam results, see Encounter Report.    Cataract, nuclear sclerotic senile, bilateral    Glaucoma screening    Refractive error      Mild to moderate NS OU = will follow.  OH OK OU otherwise.  Spec Rx given.  RTC  One year.

## 2019-07-29 ENCOUNTER — TELEPHONE (OUTPATIENT)
Dept: OPHTHALMOLOGY | Facility: CLINIC | Age: 69
End: 2019-07-29

## 2019-07-29 NOTE — TELEPHONE ENCOUNTER
----- Message from Hilario Garzon sent at 7/29/2019 11:13 AM CDT -----  Contact: pt  Pt is calling staff regarding the Patient's eye glass RX is blurry. Pt feels that the RX need some adjustment.    Pt call back [phone]  Thanks

## 2019-08-01 ENCOUNTER — OFFICE VISIT (OUTPATIENT)
Dept: OPHTHALMOLOGY | Facility: CLINIC | Age: 69
End: 2019-08-01
Payer: MEDICARE

## 2019-08-01 DIAGNOSIS — Z46.0 CONTACT LENS/GLASSES FITTING: Primary | ICD-10-CM

## 2019-08-01 PROCEDURE — 99499 NO LOS: ICD-10-PCS | Mod: S$PBB,,, | Performed by: OPTOMETRIST

## 2019-08-01 PROCEDURE — 99499 UNLISTED E&M SERVICE: CPT | Mod: S$PBB,,, | Performed by: OPTOMETRIST

## 2019-08-01 PROCEDURE — 99999 PR PBB SHADOW E&M-EST. PATIENT-LVL II: CPT | Mod: PBBFAC,,, | Performed by: OPTOMETRIST

## 2019-08-01 PROCEDURE — 99999 PR PBB SHADOW E&M-EST. PATIENT-LVL II: ICD-10-PCS | Mod: PBBFAC,,, | Performed by: OPTOMETRIST

## 2019-08-01 PROCEDURE — 99212 OFFICE O/P EST SF 10 MIN: CPT | Mod: PBBFAC | Performed by: OPTOMETRIST

## 2019-08-01 NOTE — PROGRESS NOTES
HPI     Last [facility] exam 05/23/2019  Patient had new Rx filled and is having difficulty with distance vision     Last edited by Fransico Disla MA on 8/1/2019 10:15 AM. (History)            Assessment /Plan     For exam results, see Encounter Report.    Contact lens/glasses fitting      Remake as I was off on the power.

## 2019-10-24 ENCOUNTER — OFFICE VISIT (OUTPATIENT)
Dept: UROLOGY | Facility: CLINIC | Age: 69
End: 2019-10-24
Payer: MEDICARE

## 2019-10-24 VITALS
SYSTOLIC BLOOD PRESSURE: 155 MMHG | DIASTOLIC BLOOD PRESSURE: 93 MMHG | BODY MASS INDEX: 29.94 KG/M2 | HEART RATE: 71 BPM | WEIGHT: 197.56 LBS | HEIGHT: 68 IN

## 2019-10-24 DIAGNOSIS — N40.1 BPH WITH URINARY OBSTRUCTION: Primary | ICD-10-CM

## 2019-10-24 DIAGNOSIS — N13.8 BPH WITH URINARY OBSTRUCTION: Primary | ICD-10-CM

## 2019-10-24 DIAGNOSIS — I10 ESSENTIAL HYPERTENSION: ICD-10-CM

## 2019-10-24 DIAGNOSIS — N52.01 ERECTILE DYSFUNCTION DUE TO ARTERIAL INSUFFICIENCY: ICD-10-CM

## 2019-10-24 DIAGNOSIS — C67.2 MALIGNANT NEOPLASM OF LATERAL WALL OF BLADDER: ICD-10-CM

## 2019-10-24 PROCEDURE — 99999 PR PBB SHADOW E&M-EST. PATIENT-LVL III: CPT | Mod: PBBFAC,,, | Performed by: UROLOGY

## 2019-10-24 PROCEDURE — 99214 OFFICE O/P EST MOD 30 MIN: CPT | Mod: S$PBB,,, | Performed by: UROLOGY

## 2019-10-24 PROCEDURE — 99999 PR PBB SHADOW E&M-EST. PATIENT-LVL III: ICD-10-PCS | Mod: PBBFAC,,, | Performed by: UROLOGY

## 2019-10-24 PROCEDURE — 99214 PR OFFICE/OUTPT VISIT, EST, LEVL IV, 30-39 MIN: ICD-10-PCS | Mod: S$PBB,,, | Performed by: UROLOGY

## 2019-10-24 PROCEDURE — 99213 OFFICE O/P EST LOW 20 MIN: CPT | Mod: PBBFAC | Performed by: UROLOGY

## 2019-10-24 RX ORDER — TADALAFIL 20 MG/1
20 TABLET ORAL DAILY PRN
Qty: 10 TABLET | Refills: 11 | Status: SHIPPED | OUTPATIENT
Start: 2019-10-24 | End: 2022-04-27 | Stop reason: ALTCHOICE

## 2019-10-24 RX ORDER — LIDOCAINE HYDROCHLORIDE 20 MG/ML
JELLY TOPICAL ONCE
Status: CANCELLED | OUTPATIENT
Start: 2019-10-24 | End: 2019-10-24

## 2019-10-24 NOTE — PROGRESS NOTES
CHIEF COMPLAINT:    Mr. Braxton is a 69 y.o. male presenting with ED and LUTS.    PRESENTING ILLNESS:    Naresh Braxton is a 69 y.o. male who's son is friends with Chente Cote who c/o LUTS.  He's s/p Rezum on 11/13/18.  He's voiding very well.    He's also s/p a bladder biopsy and fulguration for a very small 1 mm papillary bladder lesion.  Path showed PUNLUMP.  Post op, he went into [facility].    He has ED.  This has been present for > 1 year.  He gets good results with Cialis.    He has a history of hypogonadism.  Managed by his PCP with IM TRT.  However, he stopped it as he didn't notice a benefit.    REVIEW OF SYSTEMS:    Naresh Braxton denies headache, blurred vision, fever, nausea, vomiting, chills, abdominal pain, bleeding per rectum, cough, SOB, recent loss of consciousness, recent mental status changes, seizures, dizziness, or upper or lower extremity weakness.    YIMI  1. 2  2. 4  3. 4  4. 4  5. 4     PATIENT HISTORY:    Past Medical History:   Diagnosis Date    Arthritis     GERD (gastroesophageal reflux disease)     Hernia     Hypertension     Renal stone     Wears glasses        Past Surgical History:   Procedure Laterality Date    APPENDECTOMY      COLONOSCOPY      CYSTOSCOPY WITH BIOPSY OF BLADDER N/A 11/13/2018    Procedure: CYSTOSCOPY, WITH BLADDER BIOPSY;  Surgeon: Tree Cote MD;  Location: [facility] OR [address] [place] [address];  Service: Urology;  Laterality: N/A;  30 min    FRACTURE SURGERY Right     foot    PROSTATE BIOPSY      PROSTATE SURGERY      rezum N/A     TONSILLECTOMY         Family History   Problem Relation Age of Onset    Cancer Mother     Heart disease Father     Hypertension Brother        Social History     Socioeconomic History    Marital status:      Spouse name: Not on file    Number of children: Not on file    Years of education: Not on file    Highest education level: Not on file   Occupational History    Not on file   Social Needs    Financial resource strain: Not on  file    Food insecurity:     Worry: Not on file     Inability: Not on file    Transportation needs:     Medical: Not on file     Non-medical: Not on file   Tobacco Use    Smoking status: Never Smoker    Smokeless tobacco: Never Used   Substance and Sexual Activity    Alcohol use: Yes    Drug use: Never    Sexual activity: Yes     Partners: Female   Lifestyle    Physical activity:     Days per week: Not on file     Minutes per session: Not on file    Stress: Not on file   Relationships    Social connections:     Talks on phone: Not on file     Gets together: Not on file     Attends Zoroastrianism service: Not on file     Active member of club or organization: Not on file     Attends meetings of clubs or organizations: Not on file     Relationship status: Not on file   Other Topics Concern    Not on file   Social History Narrative    Not on file       Allergies:  Patient has no known allergies.    Medications:    Current Outpatient Medications:     aspirin 81 mg Tab, Take by mouth.  , Disp: , Rfl:     ESOMEPRAZOLE MAGNESIUM (NEXIUM ORAL), Take 40 mg by mouth once daily. , Disp: , Rfl:     fluticasone (FLONASE) 50 mcg/actuation nasal spray, 1 spray by Nasal route daily as needed. , Disp: , Rfl:     losartan-hydrochlorothiazide 50-12.5 mg (HYZAAR) 50-12.5 mg per tablet, Take 1 tablet by mouth once daily., Disp: , Rfl:     multivitamin (THERAGRAN) per tablet, Take 1 tablet by mouth once daily., Disp: , Rfl:     levoFLOXacin (LEVAQUIN) 500 MG tablet, TAKE ONE TABLET BY MOUTH ONCE DAILY FOR 30 DAYS, Disp: , Rfl: 0    promethazine-codeine 6.25-10 mg/5 ml (PHENERGAN WITH CODEINE) 6.25-10 mg/5 mL syrup, GIVE ONE TEASPOONFUL  5 ML  BY MOUTH EVERY SIX HOURS IF NEEDED  DO NOT EXCEED 30ML IN 24 HOURS, Disp: , Rfl: 0    tadalafil (CIALIS) 20 MG Tab, Take 1 tablet (20 mg total) by mouth daily as needed. Take 1 hour before intercourse, Disp: 10 tablet, Rfl: 11    TAMSULOSIN HCL (FLOMAX ORAL), Take 0.4 mg by mouth  once daily., Disp: , Rfl:     testosterone (ANDROGEL) 20.25 mg/1.25 gram (1.62 %) GlPm, Apply 2 pumps to shoulders daily (Patient not taking: Reported on 10/24/2019), Disp: 1 Bottle, Rfl: 5    Current Facility-Administered Medications:     lidocaine HCl 2% urojet, , Urethral, Once, Tree Cote MD    PHYSICAL EXAMINATION:    The patient generally appears in good health, is appropriately interactive, and is in no apparent distress.     Eyes: anicteric sclerae, moist conjunctivae; no lid-lag; PERRLA     HENT: Atraumatic; oropharynx clear with moist mucous membranes and no mucosal ulcerations;normal hard and soft palate.  No evidence of lymphadenopathy.    Neck: Trachea midline.  No thyromegaly.    Musculoskeletal: No abnormal gait.    Skin: No lesions.    Mental: Cooperative with normal affect.  Is oriented to time, place, and person.    Neuro: Grossly intact.    Chest: Normal inspiratory effort.   No accessory muscles.  No audible wheezes.  Respirations symmetric on inspiration and expiration.    Heart: Regular rhythm.      Abdomen:  Soft, non-tender. No masses or organomegaly. Bladder is not palpable. No evidence of flank discomfort. No evidence of inguinal hernia.    Genitourinary: The penis is circumcised with no evidence of plaques or induration. The urethral meatus is normal. The testes, epididymides, and cord structures are normal in size and contour bilaterally. The scrotum is normal in size and contour.    Normal anal sphincter tone. No rectal mass.    The prostate is 40 g. Normal landmarks. Lateral sulci. Median furrow intact.  No nodularity or induration. Seminal vesicles are normal.    Extremities: No clubbing, cyanosis, or edema      LABS:      Lab Results   Component Value Date    PSADIAG 3.0 01/10/2019       IMPRESSION:    Encounter Diagnoses   Name Primary?   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 Continue Cialis for the ED.   Refilled today.  2. Will stop TRT as he's not getting a benefit.  3. Due for cysto 2/20.  4. Will draw his PSA in January 2020  5. Will observe his LUTS as they don't bother him.        Copy to:

## 2020-01-24 ENCOUNTER — LAB VISIT (OUTPATIENT)
Dept: LAB | Facility: HOSPITAL | Age: 70
End: 2020-01-24
Attending: UROLOGY
Payer: MEDICARE

## 2020-01-24 DIAGNOSIS — N13.8 BPH WITH URINARY OBSTRUCTION: ICD-10-CM

## 2020-01-24 DIAGNOSIS — N40.1 BPH WITH URINARY OBSTRUCTION: ICD-10-CM

## 2020-01-24 LAB — COMPLEXED PSA SERPL-MCNC: 2.7 NG/ML (ref 0–4)

## 2020-01-24 PROCEDURE — 84153 ASSAY OF PSA TOTAL: CPT

## 2020-01-24 PROCEDURE — 36415 COLL VENOUS BLD VENIPUNCTURE: CPT | Mod: PO

## 2020-02-24 ENCOUNTER — PATIENT MESSAGE (OUTPATIENT)
Dept: UROLOGY | Facility: HOSPITAL | Age: 70
End: 2020-02-24

## 2020-08-20 ENCOUNTER — HOSPITAL ENCOUNTER (OUTPATIENT)
Dept: RADIOLOGY | Facility: HOSPITAL | Age: 70
Discharge: HOME | End: 2020-08-20
Attending: INTERNAL MEDICINE
Payer: MEDICARE

## 2020-08-20 DIAGNOSIS — M25.552 LEFT HIP PAIN: ICD-10-CM

## 2020-08-20 PROCEDURE — 73502 X-RAY EXAM HIP UNI 2-3 VIEWS: CPT | Mod: TC,LT

## 2020-09-25 DIAGNOSIS — M25.562 LEFT KNEE PAIN: Primary | ICD-10-CM

## 2020-09-28 ENCOUNTER — HOSPITAL ENCOUNTER (OUTPATIENT)
Dept: RADIOLOGY | Facility: HOSPITAL | Age: 70
Discharge: HOME | End: 2020-09-28
Attending: ORTHOPAEDIC SURGERY
Payer: MEDICARE

## 2020-09-28 DIAGNOSIS — M25.562 LEFT KNEE PAIN: ICD-10-CM

## 2020-09-28 PROCEDURE — 73721 MRI JNT OF LWR EXTRE W/O DYE: CPT | Mod: TC,LT

## 2021-05-04 ENCOUNTER — PATIENT MESSAGE (OUTPATIENT)
Dept: RESEARCH | Facility: HOSPITAL | Age: 71
End: 2021-05-04

## 2022-02-23 DIAGNOSIS — M51.26 DISC DISPLACEMENT, LUMBAR: Primary | ICD-10-CM

## 2022-02-24 ENCOUNTER — HOSPITAL ENCOUNTER (OUTPATIENT)
Dept: RADIOLOGY | Facility: HOSPITAL | Age: 72
Discharge: HOME | End: 2022-02-24
Attending: ORTHOPAEDIC SURGERY
Payer: MEDICARE

## 2022-02-24 DIAGNOSIS — M51.26 DISC DISPLACEMENT, LUMBAR: ICD-10-CM

## 2022-02-24 PROCEDURE — 72148 MRI LUMBAR SPINE W/O DYE: CPT | Mod: TC

## 2022-04-27 PROBLEM — I26.99 PULMONARY THROMBOEMBOLISM: Status: ACTIVE | Noted: 2020-03-02

## 2022-04-27 PROBLEM — K44.9 HERNIA, HIATAL: Status: ACTIVE | Noted: 2022-04-27

## 2022-04-27 PROBLEM — D01.0 CARCINOMA IN SITU OF COLON: Status: ACTIVE | Noted: 2022-04-27

## 2023-02-02 ENCOUNTER — LAB VISIT (OUTPATIENT)
Dept: LAB | Facility: HOSPITAL | Age: 73
End: 2023-02-02
Attending: INTERNAL MEDICINE
Payer: MEDICARE

## 2023-02-02 DIAGNOSIS — L29.9 PRURITUS, UNSPECIFIED: ICD-10-CM

## 2023-02-02 DIAGNOSIS — Z12.5 PROSTATE CANCER SCREENING: ICD-10-CM

## 2023-02-02 DIAGNOSIS — Z11.59 ENCOUNTER FOR HEPATITIS C SCREENING TEST FOR LOW RISK PATIENT: ICD-10-CM

## 2023-02-02 DIAGNOSIS — R73.01 IFG (IMPAIRED FASTING GLUCOSE): ICD-10-CM

## 2023-02-02 PROBLEM — C67.2 MALIGNANT NEOPLASM OF LATERAL WALL OF BLADDER: Status: RESOLVED | Noted: 2018-11-29 | Resolved: 2023-02-02

## 2023-02-02 PROBLEM — R93.1 ELEVATED CORONARY ARTERY CALCIUM SCORE: Status: ACTIVE | Noted: 2019-08-19

## 2023-02-02 PROBLEM — E78.2 MIXED HYPERLIPIDEMIA: Status: ACTIVE | Noted: 2018-11-12

## 2023-02-02 PROBLEM — I26.99 PULMONARY THROMBOEMBOLISM: Status: RESOLVED | Noted: 2020-03-02 | Resolved: 2023-02-02

## 2023-02-02 LAB
ALBUMIN SERPL BCP-MCNC: 3.9 G/DL (ref 3.5–5.2)
ALP SERPL-CCNC: 60 U/L (ref 55–135)
ALT SERPL W/O P-5'-P-CCNC: 30 U/L (ref 10–44)
ANION GAP SERPL CALC-SCNC: 3 MMOL/L (ref 8–16)
AST SERPL-CCNC: 14 U/L (ref 10–40)
BASOPHILS # BLD AUTO: 0.07 K/UL (ref 0–0.2)
BASOPHILS NFR BLD: 1.2 % (ref 0–1.9)
BILIRUB SERPL-MCNC: 0.4 MG/DL (ref 0.1–1)
BUN SERPL-MCNC: 18 MG/DL (ref 8–23)
CALCIUM SERPL-MCNC: 9.5 MG/DL (ref 8.7–10.5)
CHLORIDE SERPL-SCNC: 105 MMOL/L (ref 95–110)
CO2 SERPL-SCNC: 33 MMOL/L (ref 23–29)
COMPLEXED PSA SERPL-MCNC: 3 NG/ML (ref 0–4)
CREAT SERPL-MCNC: 0.9 MG/DL (ref 0.5–1.4)
DIFFERENTIAL METHOD: ABNORMAL
EOSINOPHIL # BLD AUTO: 0.1 K/UL (ref 0–0.5)
EOSINOPHIL NFR BLD: 2 % (ref 0–8)
ERYTHROCYTE [DISTWIDTH] IN BLOOD BY AUTOMATED COUNT: 14.2 % (ref 11.5–14.5)
EST. GFR  (NO RACE VARIABLE): >60 ML/MIN/1.73 M^2
GLUCOSE SERPL-MCNC: 102 MG/DL (ref 70–110)
HCT VFR BLD AUTO: 44.2 % (ref 40–54)
HGB BLD-MCNC: 14.9 G/DL (ref 14–18)
IMM GRANULOCYTES # BLD AUTO: 0.03 K/UL (ref 0–0.04)
IMM GRANULOCYTES NFR BLD AUTO: 0.5 % (ref 0–0.5)
LYMPHOCYTES # BLD AUTO: 1.8 K/UL (ref 1–4.8)
LYMPHOCYTES NFR BLD: 30.8 % (ref 18–48)
MCH RBC QN AUTO: 30.8 PG (ref 27–31)
MCHC RBC AUTO-ENTMCNC: 33.7 G/DL (ref 32–36)
MCV RBC AUTO: 92 FL (ref 82–98)
MONOCYTES # BLD AUTO: 0.8 K/UL (ref 0.3–1)
MONOCYTES NFR BLD: 13.4 % (ref 4–15)
NEUTROPHILS # BLD AUTO: 3.1 K/UL (ref 1.8–7.7)
NEUTROPHILS NFR BLD: 52.1 % (ref 38–73)
NRBC BLD-RTO: 0 /100 WBC
PLATELET # BLD AUTO: 139 K/UL (ref 150–450)
PMV BLD AUTO: 13 FL (ref 9.2–12.9)
POTASSIUM SERPL-SCNC: 4.2 MMOL/L (ref 3.5–5.1)
PROT SERPL-MCNC: 7.2 G/DL (ref 6–8.4)
RBC # BLD AUTO: 4.83 M/UL (ref 4.6–6.2)
SODIUM SERPL-SCNC: 141 MMOL/L (ref 136–145)
TSH SERPL DL<=0.005 MIU/L-ACNC: 0.77 UIU/ML (ref 0.4–4)
WBC # BLD AUTO: 5.9 K/UL (ref 3.9–12.7)

## 2023-02-02 PROCEDURE — 86803 HEPATITIS C AB TEST: CPT | Performed by: INTERNAL MEDICINE

## 2023-02-02 PROCEDURE — 36415 COLL VENOUS BLD VENIPUNCTURE: CPT | Performed by: INTERNAL MEDICINE

## 2023-02-02 PROCEDURE — 84443 ASSAY THYROID STIM HORMONE: CPT | Performed by: INTERNAL MEDICINE

## 2023-02-02 PROCEDURE — 85025 COMPLETE CBC W/AUTO DIFF WBC: CPT | Performed by: INTERNAL MEDICINE

## 2023-02-02 PROCEDURE — 84153 ASSAY OF PSA TOTAL: CPT | Performed by: INTERNAL MEDICINE

## 2023-02-02 PROCEDURE — 80053 COMPREHEN METABOLIC PANEL: CPT | Performed by: INTERNAL MEDICINE

## 2023-02-03 LAB — HCV AB SERPL QL IA: NORMAL

## 2023-03-22 NOTE — Clinical Note
F/U 1 YEAR IN OFFICE PER DR PORTER O-Z Plasty Text: The defect edges were debeveled with a #15 scalpel blade.  Given the location of the defect, shape of the defect and the proximity to free margins an O-Z plasty (double transposition flap) was deemed most appropriate.  Using a sterile surgical marker, the appropriate transposition flaps were drawn incorporating the defect and placing the expected incisions within the relaxed skin tension lines where possible.    The area thus outlined was incised deep to adipose tissue with a #15 scalpel blade.  The skin margins were undermined to an appropriate distance in all directions utilizing iris scissors.  Hemostasis was achieved with electrocautery.  The flaps were then transposed into place, one clockwise and the other counterclockwise, and anchored with interrupted buried subcutaneous sutures.

## 2023-10-04 ENCOUNTER — PATIENT MESSAGE (OUTPATIENT)
Dept: ADMINISTRATIVE | Facility: OTHER | Age: 73
End: 2023-10-04
Payer: MEDICARE

## 2024-02-05 ENCOUNTER — LAB VISIT (OUTPATIENT)
Dept: LAB | Facility: HOSPITAL | Age: 74
End: 2024-02-05
Attending: INTERNAL MEDICINE
Payer: MEDICARE

## 2024-02-05 DIAGNOSIS — Z12.5 PROSTATE CANCER SCREENING: ICD-10-CM

## 2024-02-05 DIAGNOSIS — R73.01 IFG (IMPAIRED FASTING GLUCOSE): ICD-10-CM

## 2024-02-05 PROBLEM — K59.09 OTHER CONSTIPATION: Status: ACTIVE | Noted: 2024-02-05

## 2024-02-05 LAB
ALBUMIN SERPL BCP-MCNC: 3.9 G/DL (ref 3.5–5.2)
ALP SERPL-CCNC: 58 U/L (ref 55–135)
ALT SERPL W/O P-5'-P-CCNC: 28 U/L (ref 10–44)
ANION GAP SERPL CALC-SCNC: 2 MMOL/L (ref 3–11)
AST SERPL-CCNC: 15 U/L (ref 10–40)
BASOPHILS # BLD AUTO: 0.05 K/UL (ref 0–0.2)
BASOPHILS NFR BLD: 0.9 % (ref 0–1.9)
BILIRUB SERPL-MCNC: 0.4 MG/DL (ref 0.1–1)
BUN SERPL-MCNC: 18 MG/DL (ref 8–23)
CALCIUM SERPL-MCNC: 9.3 MG/DL (ref 8.7–10.5)
CHLORIDE SERPL-SCNC: 106 MMOL/L (ref 95–110)
CO2 SERPL-SCNC: 32 MMOL/L (ref 23–29)
COMPLEXED PSA SERPL-MCNC: 3 NG/ML (ref 0–4)
CREAT SERPL-MCNC: 0.9 MG/DL (ref 0.5–1.4)
DIFFERENTIAL METHOD BLD: ABNORMAL
EOSINOPHIL # BLD AUTO: 0.1 K/UL (ref 0–0.5)
EOSINOPHIL NFR BLD: 1.9 % (ref 0–8)
ERYTHROCYTE [DISTWIDTH] IN BLOOD BY AUTOMATED COUNT: 13.7 % (ref 11.5–14.5)
EST. GFR  (NO RACE VARIABLE): >60 ML/MIN/1.73 M^2
GLUCOSE SERPL-MCNC: 72 MG/DL (ref 70–110)
HCT VFR BLD AUTO: 45.3 % (ref 40–54)
HGB BLD-MCNC: 15.2 G/DL (ref 14–18)
IMM GRANULOCYTES # BLD AUTO: 0.02 K/UL (ref 0–0.04)
IMM GRANULOCYTES NFR BLD AUTO: 0.4 % (ref 0–0.5)
LYMPHOCYTES # BLD AUTO: 1.9 K/UL (ref 1–4.8)
LYMPHOCYTES NFR BLD: 34 % (ref 18–48)
MCH RBC QN AUTO: 31.5 PG (ref 27–31)
MCHC RBC AUTO-ENTMCNC: 33.6 G/DL (ref 32–36)
MCV RBC AUTO: 94 FL (ref 82–98)
MONOCYTES # BLD AUTO: 0.7 K/UL (ref 0.3–1)
MONOCYTES NFR BLD: 12.5 % (ref 4–15)
NEUTROPHILS # BLD AUTO: 2.9 K/UL (ref 1.8–7.7)
NEUTROPHILS NFR BLD: 50.3 % (ref 38–73)
NRBC BLD-RTO: 0 /100 WBC
PLATELET # BLD AUTO: 142 K/UL (ref 150–450)
PMV BLD AUTO: 12 FL (ref 9.2–12.9)
POTASSIUM SERPL-SCNC: 4.1 MMOL/L (ref 3.5–5.1)
PROT SERPL-MCNC: 7.3 G/DL (ref 6–8.4)
RBC # BLD AUTO: 4.83 M/UL (ref 4.6–6.2)
SODIUM SERPL-SCNC: 140 MMOL/L (ref 136–145)
WBC # BLD AUTO: 5.7 K/UL (ref 3.9–12.7)

## 2024-02-05 PROCEDURE — 85025 COMPLETE CBC W/AUTO DIFF WBC: CPT | Performed by: INTERNAL MEDICINE

## 2024-02-05 PROCEDURE — 80053 COMPREHEN METABOLIC PANEL: CPT | Performed by: INTERNAL MEDICINE

## 2024-02-05 PROCEDURE — 84153 ASSAY OF PSA TOTAL: CPT | Performed by: INTERNAL MEDICINE

## 2024-02-05 PROCEDURE — 36415 COLL VENOUS BLD VENIPUNCTURE: CPT | Performed by: INTERNAL MEDICINE

## 2024-02-23 ENCOUNTER — TELEPHONE (OUTPATIENT)
Dept: HEPATOLOGY | Facility: HOSPITAL | Age: 74
End: 2024-02-23
Payer: MEDICARE

## 2024-04-03 ENCOUNTER — APPOINTMENT (OUTPATIENT)
Dept: LAB | Facility: HOSPITAL | Age: 74
End: 2024-04-03
Attending: INTERNAL MEDICINE
Payer: MEDICARE

## 2024-08-07 PROBLEM — E88.810 METABOLIC SYNDROME: Status: ACTIVE | Noted: 2019-05-14

## 2024-08-07 PROBLEM — C43.30 MALIGNANT MELANOMA OF FACE EXCLUDING EYELID, NOSE, LIP, AND EAR: Status: ACTIVE | Noted: 2023-11-27

## 2024-10-21 ENCOUNTER — PATIENT MESSAGE (OUTPATIENT)
Dept: FAMILY MEDICINE | Facility: CLINIC | Age: 74
End: 2024-10-21
Payer: MEDICARE

## 2025-01-06 ENCOUNTER — HOSPITAL ENCOUNTER (OUTPATIENT)
Dept: RADIOLOGY | Facility: HOSPITAL | Age: 75
Discharge: HOME | End: 2025-01-06
Attending: INTERNAL MEDICINE
Payer: MEDICARE

## 2025-01-06 DIAGNOSIS — R07.9 CHEST PAIN, UNSPECIFIED TYPE: ICD-10-CM

## 2025-01-06 PROCEDURE — 71046 X-RAY EXAM CHEST 2 VIEWS: CPT | Mod: TC

## 2025-02-11 ENCOUNTER — LAB VISIT (OUTPATIENT)
Dept: LAB | Facility: HOSPITAL | Age: 75
End: 2025-02-11
Attending: INTERNAL MEDICINE
Payer: MEDICARE

## 2025-02-11 DIAGNOSIS — I10 ESSENTIAL HYPERTENSION: ICD-10-CM

## 2025-02-11 DIAGNOSIS — N18.30 STAGE 3 CHRONIC KIDNEY DISEASE, UNSPECIFIED WHETHER STAGE 3A OR 3B CKD: ICD-10-CM

## 2025-02-11 DIAGNOSIS — R73.01 IFG (IMPAIRED FASTING GLUCOSE): ICD-10-CM

## 2025-02-11 DIAGNOSIS — Z12.5 PROSTATE CANCER SCREENING: ICD-10-CM

## 2025-02-11 LAB
ALBUMIN SERPL BCP-MCNC: 4.4 G/DL (ref 3.5–5.2)
ALP SERPL-CCNC: 52 U/L (ref 55–135)
ALT SERPL W/O P-5'-P-CCNC: 18 U/L (ref 10–44)
ANION GAP SERPL CALC-SCNC: 11 MMOL/L (ref 8–16)
AST SERPL-CCNC: 21 U/L (ref 10–40)
BASOPHILS # BLD AUTO: 0.04 K/UL (ref 0–0.2)
BASOPHILS NFR BLD: 0.7 % (ref 0–1.9)
BILIRUB SERPL-MCNC: 0.6 MG/DL (ref 0.1–1)
BUN SERPL-MCNC: 18 MG/DL (ref 8–23)
CALCIUM SERPL-MCNC: 9.7 MG/DL (ref 8.7–10.5)
CHLORIDE SERPL-SCNC: 102 MMOL/L (ref 95–110)
CHOLEST SERPL-MCNC: 138 MG/DL (ref 120–199)
CHOLEST/HDLC SERPL: 2.4 {RATIO} (ref 2–5)
CO2 SERPL-SCNC: 28 MMOL/L (ref 23–29)
COMPLEXED PSA SERPL-MCNC: 3.1 NG/ML (ref 0–4)
CREAT SERPL-MCNC: 1 MG/DL (ref 0.5–1.4)
DIFFERENTIAL METHOD BLD: ABNORMAL
EOSINOPHIL # BLD AUTO: 0.1 K/UL (ref 0–0.5)
EOSINOPHIL NFR BLD: 2.1 % (ref 0–8)
ERYTHROCYTE [DISTWIDTH] IN BLOOD BY AUTOMATED COUNT: 13.8 % (ref 11.5–14.5)
EST. GFR  (NO RACE VARIABLE): >60 ML/MIN/1.73 M^2
GLUCOSE SERPL-MCNC: 99 MG/DL (ref 70–110)
HCT VFR BLD AUTO: 45.6 % (ref 40–54)
HDLC SERPL-MCNC: 57 MG/DL (ref 40–75)
HDLC SERPL: 41.3 % (ref 20–50)
HGB BLD-MCNC: 15.1 G/DL (ref 14–18)
IMM GRANULOCYTES # BLD AUTO: 0.01 K/UL (ref 0–0.04)
IMM GRANULOCYTES NFR BLD AUTO: 0.2 % (ref 0–0.5)
LDLC SERPL CALC-MCNC: 63.8 MG/DL (ref 63–159)
LYMPHOCYTES # BLD AUTO: 1.8 K/UL (ref 1–4.8)
LYMPHOCYTES NFR BLD: 31.6 % (ref 18–48)
MCH RBC QN AUTO: 31.1 PG (ref 27–31)
MCHC RBC AUTO-ENTMCNC: 33.1 G/DL (ref 32–36)
MCV RBC AUTO: 94 FL (ref 82–98)
MONOCYTES # BLD AUTO: 0.7 K/UL (ref 0.3–1)
MONOCYTES NFR BLD: 11.2 % (ref 4–15)
NEUTROPHILS # BLD AUTO: 3.2 K/UL (ref 1.8–7.7)
NEUTROPHILS NFR BLD: 54.2 % (ref 38–73)
NONHDLC SERPL-MCNC: 81 MG/DL
NRBC BLD-RTO: 0 /100 WBC
PLATELET # BLD AUTO: 138 K/UL (ref 150–450)
PMV BLD AUTO: 12.5 FL (ref 9.2–12.9)
POTASSIUM SERPL-SCNC: 3.8 MMOL/L (ref 3.5–5.1)
PROT SERPL-MCNC: 7.3 G/DL (ref 6–8.4)
RBC # BLD AUTO: 4.86 M/UL (ref 4.6–6.2)
SODIUM SERPL-SCNC: 141 MMOL/L (ref 136–145)
TRIGL SERPL-MCNC: 86 MG/DL (ref 30–150)
TSH SERPL DL<=0.005 MIU/L-ACNC: 0.84 UIU/ML (ref 0.4–4)
WBC # BLD AUTO: 5.8 K/UL (ref 3.9–12.7)

## 2025-02-11 PROCEDURE — 84153 ASSAY OF PSA TOTAL: CPT | Performed by: INTERNAL MEDICINE

## 2025-02-11 PROCEDURE — 36415 COLL VENOUS BLD VENIPUNCTURE: CPT | Performed by: INTERNAL MEDICINE

## 2025-02-11 PROCEDURE — 85025 COMPLETE CBC W/AUTO DIFF WBC: CPT | Performed by: INTERNAL MEDICINE

## 2025-02-11 PROCEDURE — 80053 COMPREHEN METABOLIC PANEL: CPT | Performed by: INTERNAL MEDICINE

## 2025-02-11 PROCEDURE — 84443 ASSAY THYROID STIM HORMONE: CPT | Performed by: INTERNAL MEDICINE

## 2025-02-11 PROCEDURE — 82306 VITAMIN D 25 HYDROXY: CPT | Performed by: INTERNAL MEDICINE

## 2025-02-11 PROCEDURE — 80061 LIPID PANEL: CPT | Performed by: INTERNAL MEDICINE

## 2025-02-12 LAB — 25(OH)D3+25(OH)D2 SERPL-MCNC: 36 NG/ML (ref 30–96)

## 2025-03-07 ENCOUNTER — HOSPITAL ENCOUNTER (EMERGENCY)
Facility: HOSPITAL | Age: 75
Discharge: HOME | End: 2025-03-07
Attending: EMERGENCY MEDICINE
Payer: MEDICARE

## 2025-03-07 VITALS
SYSTOLIC BLOOD PRESSURE: 132 MMHG | HEART RATE: 80 BPM | RESPIRATION RATE: 18 BRPM | OXYGEN SATURATION: 100 % | DIASTOLIC BLOOD PRESSURE: 70 MMHG

## 2025-03-07 DIAGNOSIS — I49.1 PREMATURE ATRIAL CONTRACTIONS: Primary | ICD-10-CM

## 2025-03-07 DIAGNOSIS — R07.89 ATYPICAL CHEST PAIN: ICD-10-CM

## 2025-03-07 LAB
ALBUMIN SERPL BCP-MCNC: 4 G/DL (ref 3.5–5.2)
ALP SERPL-CCNC: 61 U/L (ref 55–135)
ALT SERPL W/O P-5'-P-CCNC: 22 U/L (ref 10–44)
ANION GAP SERPL CALC-SCNC: 10 MMOL/L (ref 8–16)
AST SERPL-CCNC: 19 U/L (ref 10–40)
BASOPHILS # BLD AUTO: 0.04 K/UL (ref 0–0.2)
BASOPHILS NFR BLD: 0.5 % (ref 0–1.9)
BILIRUB SERPL-MCNC: 0.5 MG/DL (ref 0.1–1)
BUN SERPL-MCNC: 17 MG/DL (ref 8–23)
CALCIUM SERPL-MCNC: 9.2 MG/DL (ref 8.7–10.5)
CHLORIDE SERPL-SCNC: 101 MMOL/L (ref 95–110)
CO2 SERPL-SCNC: 26 MMOL/L (ref 23–29)
CREAT SERPL-MCNC: 1 MG/DL (ref 0.5–1.4)
DIFFERENTIAL METHOD BLD: ABNORMAL
EOSINOPHIL # BLD AUTO: 0.1 K/UL (ref 0–0.5)
EOSINOPHIL NFR BLD: 1.1 % (ref 0–8)
ERYTHROCYTE [DISTWIDTH] IN BLOOD BY AUTOMATED COUNT: 13.3 % (ref 11.5–14.5)
EST. GFR  (NO RACE VARIABLE): >60 ML/MIN/1.73 M^2
GLUCOSE SERPL-MCNC: 101 MG/DL (ref 70–110)
HCT VFR BLD AUTO: 45.7 % (ref 40–54)
HGB BLD-MCNC: 15.6 G/DL (ref 14–18)
IMM GRANULOCYTES # BLD AUTO: 0.1 K/UL (ref 0–0.04)
IMM GRANULOCYTES NFR BLD AUTO: 1.1 % (ref 0–0.5)
LYMPHOCYTES # BLD AUTO: 1.5 K/UL (ref 1–4.8)
LYMPHOCYTES NFR BLD: 17.3 % (ref 18–48)
MAGNESIUM SERPL-MCNC: 2.1 MG/DL (ref 1.6–2.6)
MCH RBC QN AUTO: 31.3 PG (ref 27–31)
MCHC RBC AUTO-ENTMCNC: 34.1 G/DL (ref 32–36)
MCV RBC AUTO: 92 FL (ref 82–98)
MONOCYTES # BLD AUTO: 1.3 K/UL (ref 0.3–1)
MONOCYTES NFR BLD: 14.4 % (ref 4–15)
NEUTROPHILS # BLD AUTO: 5.7 K/UL (ref 1.8–7.7)
NEUTROPHILS NFR BLD: 65.6 % (ref 38–73)
NRBC BLD-RTO: 0 /100 WBC
OHS QRS DURATION: 94 MS
OHS QTC CALCULATION: 438 MS
PLATELET # BLD AUTO: 161 K/UL (ref 150–450)
PMV BLD AUTO: 11.6 FL (ref 9.2–12.9)
POTASSIUM SERPL-SCNC: 3.8 MMOL/L (ref 3.5–5.1)
PROT SERPL-MCNC: 7.1 G/DL (ref 6–8.4)
RBC # BLD AUTO: 4.98 M/UL (ref 4.6–6.2)
SODIUM SERPL-SCNC: 137 MMOL/L (ref 136–145)
TROPONIN I SERPL DL<=0.01 NG/ML-MCNC: <3 NG/L (ref 0–35)
WBC # BLD AUTO: 8.72 K/UL (ref 3.9–12.7)

## 2025-03-07 PROCEDURE — 93010 ELECTROCARDIOGRAM REPORT: CPT | Mod: ,,, | Performed by: INTERNAL MEDICINE

## 2025-03-07 PROCEDURE — 25000003 PHARM REV CODE 250: Performed by: EMERGENCY MEDICINE

## 2025-03-07 PROCEDURE — 85025 COMPLETE CBC W/AUTO DIFF WBC: CPT | Performed by: EMERGENCY MEDICINE

## 2025-03-07 PROCEDURE — 93005 ELECTROCARDIOGRAM TRACING: CPT

## 2025-03-07 PROCEDURE — 36415 COLL VENOUS BLD VENIPUNCTURE: CPT | Performed by: EMERGENCY MEDICINE

## 2025-03-07 PROCEDURE — 83735 ASSAY OF MAGNESIUM: CPT | Performed by: EMERGENCY MEDICINE

## 2025-03-07 PROCEDURE — 99285 EMERGENCY DEPT VISIT HI MDM: CPT | Mod: 25

## 2025-03-07 PROCEDURE — 80053 COMPREHEN METABOLIC PANEL: CPT | Performed by: EMERGENCY MEDICINE

## 2025-03-07 PROCEDURE — 84484 ASSAY OF TROPONIN QUANT: CPT | Performed by: EMERGENCY MEDICINE

## 2025-03-07 RX ORDER — LIDOCAINE HYDROCHLORIDE 20 MG/ML
15 SOLUTION OROPHARYNGEAL ONCE
Status: COMPLETED | OUTPATIENT
Start: 2025-03-07 | End: 2025-03-07

## 2025-03-07 RX ORDER — ALUMINUM HYDROXIDE, MAGNESIUM HYDROXIDE, AND SIMETHICONE 1200; 120; 1200 MG/30ML; MG/30ML; MG/30ML
30 SUSPENSION ORAL ONCE
Status: COMPLETED | OUTPATIENT
Start: 2025-03-07 | End: 2025-03-07

## 2025-03-07 RX ADMIN — LIDOCAINE HYDROCHLORIDE 15 ML: 20 SOLUTION ORAL at 10:03

## 2025-03-07 RX ADMIN — ALUMINUM HYDROXIDE, MAGNESIUM HYDROXIDE, AND DIMETHICONE 30 ML: 200; 20; 200 SUSPENSION ORAL at 10:03

## 2025-03-07 NOTE — ED PROVIDER NOTES
"Encounter Date: 3/7/2025       History     Chief Complaint   Patient presents with    Chest Pain     Intermittent chest pain that started lastnight, patient states "it thumps in one certain spot and goes away" anterior left chest      74-year-old male history of costochondritis in the past, presents to the ER stating around 430 this morning woke up and had some palpitations and felt a pain that he describes as "thumping" that occurred on and off since 04/30.  Denies diaphoresis.  Is having a lot of belching, no nausea vomiting.  Did have some diarrhea yesterday.  No other issues.  No shortness of breath whatsoever.  No cough or hemoptysis.  Oxygen saturation is 100% on room air.  No alleviating or contributing factors      Review of patient's allergies indicates:   Allergen Reactions    Codeine Itching     Past Medical History:   Diagnosis Date    Actinic keratosis     Allergic rhinitis due to pollen     Arthralgia     Arthritis     Back pain of lumbar region with sciatica     BPH (benign prostatic hyperplasia)     Cervical disc disease     Costochondritis     Diverticulitis     Epigastric pain     GERD (gastroesophageal reflux disease)     Headache, migraine, intractable     Hernia     Hernia, hiatal     Impaired fasting glucose     Kidney stone     Lesion of bladder     Lumbar disc disease     Male erectile dysfunction, unspecified     Malignant melanoma of skin, unspecified     Malignant neoplasm of lateral wall of bladder 11/29/2018    Melena     Odynophagia     Prostatitis     Pulmonary thromboembolism 03/02/2020    Formatting of this note might be different from the original. Pulmonary thromboembolism.  Appears more scarlike and old. Post back surgery  Last Assessment & Plan:  Formatting of this note might be different from the original. Continue aspirin.  Will DC Xarelto at this point..  Patient has another PTE event would recommend recommencement of Xarelto    Rectal bleeding     Renal stone     Ruptured " lumbar disc     Unspecified hemorrhoids     Wears glasses      Past Surgical History:   Procedure Laterality Date    APPENDECTOMY      BACK SURGERY  02/04/2020    L 4-5 Dr. Ward in [place]    CAROTID STENT N/A     COLONOSCOPY N/A     2003, 07/2007    CYSTOSCOPY WITH BIOPSY OF BLADDER N/A 11/13/2018    Procedure: CYSTOSCOPY, WITH BLADDER BIOPSY;  Surgeon: Tree Cote MD;  Location: [facility] OR [address] [place] [address];  Service: Urology;  Laterality: N/A;  30 min    ESOPHAGOGASTRODUODENOSCOPY N/A     2008    FRACTURE SURGERY Right     4/2006    LITHOTRIPSY N/A 03/18/2017    Dr. Fox    NECK SURGERY N/A 02/07/2018    Dr. Kaur    PROSTATE BIOPSY      PROSTATE SURGERY N/A     11/2018 Rezum Prostate Procedure    rezum N/A     SKIN CANCER EXCISION      TONSILLECTOMY N/A     1956     Family History   Problem Relation Name Age of Onset    Cancer Mother      Diabetes Mellitus Mother      Heart disease Father      Hypertension Brother       Social History[1]  Review of Systems   Constitutional:  Negative for fever.   HENT:  Negative for sore throat.    Respiratory:  Negative for shortness of breath.    Cardiovascular:  Negative for chest pain.   Gastrointestinal:  Negative for nausea.   Genitourinary:  Negative for dysuria.   Musculoskeletal:  Negative for back pain.   Skin:  Negative for rash.   Neurological:  Negative for weakness.   Hematological:  Does not bruise/bleed easily.   All other systems reviewed and are negative.      Physical Exam     Initial Vitals [03/07/25 0915]   BP Pulse Resp Temp SpO2   (!) 155/80 73 17 -- 100 %      MAP       --         Physical Exam    Nursing note and vitals reviewed.  Constitutional: He appears well-developed and well-nourished. He is not diaphoretic. No distress.   HENT:   Head: Normocephalic and atraumatic. Mouth/Throat: Oropharynx is clear and moist.   Eyes: Conjunctivae and EOM are normal. Pupils are equal, round, and reactive to light. Right eye exhibits no discharge. Left eye exhibits  no discharge. No scleral icterus.   Neck: Neck supple. No JVD present.   Normal range of motion.  Cardiovascular:  Normal rate, regular rhythm, normal heart sounds and intact distal pulses.           No murmur heard.  Pulmonary/Chest: Breath sounds normal. No stridor. No respiratory distress. He has no wheezes. He has no rhonchi. He has no rales. He exhibits no tenderness.   Abdominal: Abdomen is soft. Bowel sounds are normal. He exhibits no distension and no mass. There is no abdominal tenderness. There is no rebound and no guarding.   Musculoskeletal:         General: No tenderness or edema. Normal range of motion.      Cervical back: Normal range of motion and neck supple.     Neurological: He is alert and oriented to person, place, and time. He has normal strength. GCS score is 15. GCS eye subscore is 4. GCS verbal subscore is 5. GCS motor subscore is 6.   Skin: Skin is warm and dry. Capillary refill takes less than 2 seconds.         ED Course   Procedures  Labs Reviewed   CBC W/ AUTO DIFFERENTIAL - Abnormal       Result Value    WBC 8.72      RBC 4.98      Hemoglobin 15.6      Hematocrit 45.7      MCV 92      MCH 31.3 (*)     MCHC 34.1      RDW 13.3      Platelets 161      MPV 11.6      Immature Granulocytes 1.1 (*)     Gran # (ANC) 5.7      Immature Grans (Abs) 0.10 (*)     Lymph # 1.5      Mono # 1.3 (*)     Eos # 0.1      Baso # 0.04      nRBC 0      Gran % 65.6      Lymph % 17.3 (*)     Mono % 14.4      Eosinophil % 1.1      Basophil % 0.5      Differential Method Automated     COMPREHENSIVE METABOLIC PANEL    Sodium 137      Potassium 3.8      Chloride 101      CO2 26      Glucose 101      BUN 17      Creatinine 1.0      Calcium 9.2      Total Protein 7.1      Albumin 4.0      Total Bilirubin 0.5      Alkaline Phosphatase 61      AST 19      ALT 22      eGFR >60.0      Anion Gap 10     TROPONIN I HIGH SENSITIVITY    Troponin I High Sensitivity <3     MAGNESIUM    Magnesium 2.1       EKG Readings:  "(Independently Interpreted)   Initial Reading: No STEMI. Rhythm: Normal Sinus Rhythm. Heart Rate: 79. Ectopy: PACs. Conduction: Normal. ST Segments: Normal ST Segments. T Waves: Normal. Axis: Normal. Clinical Impression: Normal Sinus Rhythm with PACs       Imaging Results              X-Ray Chest 1 View (Final result)  Result time 03/07/25 10:33:34      Final result by Kenyetta Almageur MD (03/07/25 10:33:34)                   Impression:      No acute radiographic abnormalities detected      Electronically signed by: Kenyetta Almaguer MD  Date:    03/07/2025  Time:    10:33               Narrative:    EXAMINATION:  XR CHEST 1 VIEW    CLINICAL HISTORY:  Other chest pain    COMPARISON:  01/06/2025    FINDINGS:  Clear lungs.  No signs of CHF.  Prior ACF.                                       Medications   aluminum-magnesium hydroxide-simethicone 200-200-20 mg/5 mL suspension 30 mL (has no administration in time range)     And   LIDOcaine viscous HCl 2% oral solution 15 mL (has no administration in time range)     Medical Decision Making  Amount and/or Complexity of Data Reviewed  Labs: ordered.  Radiology: ordered.    Risk  OTC drugs.  Prescription drug management.               ED Course as of 03/07/25 1051   Fri Mar 07, 2025   1039 Labs are unremarkable.  EKG is unremarkable.  Stable for discharge and follow up to primary care physician [SD]   1041 His "thumping pain" correlates with PACs on the monitor [SD]      ED Course User Index  [SD] Jackson Mata MD               Medical Decision Making:   Differential Diagnosis:   Atypical chest pain, premature atrial complex  ED Management:  Patient had 1 episode of having this "thumping" pain here in the ER, corresponded perfectly with PACs on the monitor  Prior to discharge, patient asked something for belching.  He denies any pain whatsoever.  GI cocktail was ordered             Clinical Impression:  Final diagnoses:  [R07.89] Atypical chest pain  [I49.1] Premature " atrial contractions (Primary)          ED Disposition Condition    Discharge Stable          ED Prescriptions    None       Follow-up Information       Follow up With Specialties Details Why Contact Info Additional Information    Primary care physician  In 2 days       [facility] Emergency Department Emergency Medicine  As needed, If symptoms worsen [address] [facility] [id number]  [phone] Floor [address]               Jackson Mata MD  03/07/25 1042         [1]   Social History  Tobacco Use    Smoking status: Never    Smokeless tobacco: Never   Substance Use Topics    Alcohol use: Yes     Comment: SOCIALLY    Drug use: Never        Jackson Mata MD  03/07/25 1051

## 2025-03-07 NOTE — ED NOTES
NEUROLOGICAL:   Patient is awake , alert , and oriented x 4 .   Moves all extremities without difficulty.   Patient reports no neuro complaints..  GCS 15    CARDIOVASCULAR:   On palpation no edema noted , noted to none.   Patient reports no chest pain during event or at this time.     RESPIRATORY:   Airway Clear, Open, and Patent.  Respirations are even and unlabored.   Breath sounds clear  to all lung fields.   Patient reports no respiratory complaints.     GASTROINTESTINAL:   Abdomen is soft  and non-tender x 4 quadrants. Bowel sounds are normoactive to all quadrants .   Patient reports no GI complaints .     SKIN:   Skin appears warm , dry , good turgor, color normal for race, and intact.

## (undated) DEVICE — SYS DELIVERY REZUM

## (undated) DEVICE — SYR 10CC LUER LOCK

## (undated) DEVICE — HOLDER CATH IAB ADH STATLOCK

## (undated) DEVICE — TRAY CYSTO BASIN

## (undated) DEVICE — SOL NACL IRR 3000ML

## (undated) DEVICE — PACK CYSTO

## (undated) DEVICE — Device

## (undated) DEVICE — GOWN SMARTGOWN LVL4 X-LONG XL

## (undated) DEVICE — BAG URINARY DRAINAGE 2000ML